# Patient Record
Sex: FEMALE | Race: WHITE | Employment: PART TIME | URBAN - METROPOLITAN AREA
[De-identification: names, ages, dates, MRNs, and addresses within clinical notes are randomized per-mention and may not be internally consistent; named-entity substitution may affect disease eponyms.]

---

## 2022-07-16 ENCOUNTER — HOSPITAL ENCOUNTER (OUTPATIENT)
Age: 62
Setting detail: OBSERVATION
Discharge: HOME | End: 2022-07-18
Attending: INTERNAL MEDICINE | Admitting: INTERNAL MEDICINE
Payer: COMMERCIAL

## 2022-07-16 ENCOUNTER — APPOINTMENT (OUTPATIENT)
Dept: CT IMAGING | Age: 62
End: 2022-07-16
Payer: COMMERCIAL

## 2022-07-16 ENCOUNTER — APPOINTMENT (OUTPATIENT)
Dept: GENERAL RADIOLOGY | Age: 62
End: 2022-07-16
Payer: COMMERCIAL

## 2022-07-16 DIAGNOSIS — G45.9 TIA (TRANSIENT ISCHEMIC ATTACK): Primary | ICD-10-CM

## 2022-07-16 LAB
ALBUMIN SERPL-MCNC: 4.5 G/DL (ref 3.5–4.6)
ALP BLD-CCNC: 78 U/L (ref 40–130)
ALT SERPL-CCNC: 19 U/L (ref 0–33)
ANION GAP SERPL CALCULATED.3IONS-SCNC: 14 MEQ/L (ref 9–15)
APTT: 27.9 SEC (ref 24.4–36.8)
AST SERPL-CCNC: 20 U/L (ref 0–35)
BASOPHILS ABSOLUTE: 0 K/UL (ref 0–0.2)
BASOPHILS RELATIVE PERCENT: 0.6 %
BILIRUB SERPL-MCNC: 0.7 MG/DL (ref 0.2–0.7)
BUN BLDV-MCNC: 14 MG/DL (ref 8–23)
CALCIUM SERPL-MCNC: 9.9 MG/DL (ref 8.5–9.9)
CHLORIDE BLD-SCNC: 101 MEQ/L (ref 95–107)
CO2: 26 MEQ/L (ref 20–31)
CREAT SERPL-MCNC: 0.99 MG/DL (ref 0.5–0.9)
EOSINOPHILS ABSOLUTE: 0 K/UL (ref 0–0.7)
EOSINOPHILS RELATIVE PERCENT: 0.8 %
GFR AFRICAN AMERICAN: >60
GFR NON-AFRICAN AMERICAN: 56.5
GLOBULIN: 2.8 G/DL (ref 2.3–3.5)
GLUCOSE BLD-MCNC: 85 MG/DL (ref 70–99)
HCT VFR BLD CALC: 43.7 % (ref 37–47)
HEMOGLOBIN: 14.5 G/DL (ref 12–16)
INR BLD: 1
LYMPHOCYTES ABSOLUTE: 1.3 K/UL (ref 1–4.8)
LYMPHOCYTES RELATIVE PERCENT: 26.5 %
MAGNESIUM: 2.2 MG/DL (ref 1.7–2.4)
MCH RBC QN AUTO: 30.6 PG (ref 27–31.3)
MCHC RBC AUTO-ENTMCNC: 33.1 % (ref 33–37)
MCV RBC AUTO: 92.5 FL (ref 82–100)
MONOCYTES ABSOLUTE: 0.3 K/UL (ref 0.2–0.8)
MONOCYTES RELATIVE PERCENT: 6.8 %
NEUTROPHILS ABSOLUTE: 3.3 K/UL (ref 1.4–6.5)
NEUTROPHILS RELATIVE PERCENT: 65.3 %
PDW BLD-RTO: 13.5 % (ref 11.5–14.5)
PLATELET # BLD: 216 K/UL (ref 130–400)
POTASSIUM SERPL-SCNC: 4.5 MEQ/L (ref 3.4–4.9)
PROTHROMBIN TIME: 12.9 SEC (ref 12.3–14.9)
RBC # BLD: 4.72 M/UL (ref 4.2–5.4)
SARS-COV-2, NAAT: NOT DETECTED
SODIUM BLD-SCNC: 141 MEQ/L (ref 135–144)
TOTAL PROTEIN: 7.3 G/DL (ref 6.3–8)
TROPONIN: <0.01 NG/ML (ref 0–0.01)
WBC # BLD: 5.1 K/UL (ref 4.8–10.8)

## 2022-07-16 PROCEDURE — G0378 HOSPITAL OBSERVATION PER HR: HCPCS

## 2022-07-16 PROCEDURE — 6370000000 HC RX 637 (ALT 250 FOR IP): Performed by: INTERNAL MEDICINE

## 2022-07-16 PROCEDURE — 80053 COMPREHEN METABOLIC PANEL: CPT

## 2022-07-16 PROCEDURE — 70450 CT HEAD/BRAIN W/O DYE: CPT

## 2022-07-16 PROCEDURE — 2580000003 HC RX 258: Performed by: PHYSICIAN ASSISTANT

## 2022-07-16 PROCEDURE — 87635 SARS-COV-2 COVID-19 AMP PRB: CPT

## 2022-07-16 PROCEDURE — 85025 COMPLETE CBC W/AUTO DIFF WBC: CPT

## 2022-07-16 PROCEDURE — 83735 ASSAY OF MAGNESIUM: CPT

## 2022-07-16 PROCEDURE — 6360000002 HC RX W HCPCS: Performed by: INTERNAL MEDICINE

## 2022-07-16 PROCEDURE — 36415 COLL VENOUS BLD VENIPUNCTURE: CPT

## 2022-07-16 PROCEDURE — 96372 THER/PROPH/DIAG INJ SC/IM: CPT

## 2022-07-16 PROCEDURE — 84484 ASSAY OF TROPONIN QUANT: CPT

## 2022-07-16 PROCEDURE — 71045 X-RAY EXAM CHEST 1 VIEW: CPT

## 2022-07-16 PROCEDURE — 85730 THROMBOPLASTIN TIME PARTIAL: CPT

## 2022-07-16 PROCEDURE — 6370000000 HC RX 637 (ALT 250 FOR IP): Performed by: PHYSICIAN ASSISTANT

## 2022-07-16 PROCEDURE — 99285 EMERGENCY DEPT VISIT HI MDM: CPT

## 2022-07-16 PROCEDURE — 93005 ELECTROCARDIOGRAM TRACING: CPT | Performed by: PHYSICIAN ASSISTANT

## 2022-07-16 PROCEDURE — 85610 PROTHROMBIN TIME: CPT

## 2022-07-16 RX ORDER — ASPIRIN 81 MG/1
324 TABLET, CHEWABLE ORAL ONCE
Status: COMPLETED | OUTPATIENT
Start: 2022-07-16 | End: 2022-07-16

## 2022-07-16 RX ORDER — CALCIUM CARBONATE 500(1250)
1000 TABLET ORAL DAILY
Status: DISCONTINUED | OUTPATIENT
Start: 2022-07-17 | End: 2022-07-18 | Stop reason: HOSPADM

## 2022-07-16 RX ORDER — ZOLPIDEM TARTRATE 5 MG/1
5 TABLET ORAL NIGHTLY PRN
Status: DISCONTINUED | OUTPATIENT
Start: 2022-07-16 | End: 2022-07-18 | Stop reason: HOSPADM

## 2022-07-16 RX ORDER — ONDANSETRON 4 MG/1
4 TABLET, ORALLY DISINTEGRATING ORAL EVERY 8 HOURS PRN
Status: DISCONTINUED | OUTPATIENT
Start: 2022-07-16 | End: 2022-07-18 | Stop reason: HOSPADM

## 2022-07-16 RX ORDER — ONDANSETRON 2 MG/ML
4 INJECTION INTRAMUSCULAR; INTRAVENOUS EVERY 6 HOURS PRN
Status: DISCONTINUED | OUTPATIENT
Start: 2022-07-16 | End: 2022-07-18 | Stop reason: HOSPADM

## 2022-07-16 RX ORDER — METHIMAZOLE 5 MG/1
5 TABLET ORAL DAILY
Status: DISCONTINUED | OUTPATIENT
Start: 2022-07-17 | End: 2022-07-18 | Stop reason: HOSPADM

## 2022-07-16 RX ORDER — ATORVASTATIN CALCIUM 80 MG/1
80 TABLET, FILM COATED ORAL NIGHTLY
Status: DISCONTINUED | OUTPATIENT
Start: 2022-07-16 | End: 2022-07-18 | Stop reason: HOSPADM

## 2022-07-16 RX ORDER — ASPIRIN 300 MG/1
300 SUPPOSITORY RECTAL DAILY
Status: DISCONTINUED | OUTPATIENT
Start: 2022-07-16 | End: 2022-07-18 | Stop reason: HOSPADM

## 2022-07-16 RX ORDER — ZOLPIDEM TARTRATE 10 MG/1
5 TABLET ORAL NIGHTLY PRN
COMMUNITY

## 2022-07-16 RX ORDER — ESCITALOPRAM OXALATE 10 MG/1
10 TABLET ORAL DAILY
Status: DISCONTINUED | OUTPATIENT
Start: 2022-07-17 | End: 2022-07-18 | Stop reason: HOSPADM

## 2022-07-16 RX ORDER — IBUPROFEN 200 MG
2 CAPSULE ORAL DAILY
COMMUNITY

## 2022-07-16 RX ORDER — PANTOPRAZOLE SODIUM 40 MG/1
40 GRANULE, DELAYED RELEASE ORAL
COMMUNITY

## 2022-07-16 RX ORDER — POLYETHYLENE GLYCOL 3350 17 G/17G
17 POWDER, FOR SOLUTION ORAL DAILY PRN
Status: DISCONTINUED | OUTPATIENT
Start: 2022-07-16 | End: 2022-07-18 | Stop reason: HOSPADM

## 2022-07-16 RX ORDER — LABETALOL HYDROCHLORIDE 5 MG/ML
10 INJECTION, SOLUTION INTRAVENOUS EVERY 10 MIN PRN
Status: DISCONTINUED | OUTPATIENT
Start: 2022-07-16 | End: 2022-07-18 | Stop reason: HOSPADM

## 2022-07-16 RX ORDER — 0.9 % SODIUM CHLORIDE 0.9 %
500 INTRAVENOUS SOLUTION INTRAVENOUS ONCE
Status: COMPLETED | OUTPATIENT
Start: 2022-07-16 | End: 2022-07-16

## 2022-07-16 RX ORDER — METHIMAZOLE 5 MG/1
5 TABLET ORAL DAILY
COMMUNITY

## 2022-07-16 RX ORDER — ENOXAPARIN SODIUM 100 MG/ML
40 INJECTION SUBCUTANEOUS DAILY
Status: DISCONTINUED | OUTPATIENT
Start: 2022-07-16 | End: 2022-07-18 | Stop reason: HOSPADM

## 2022-07-16 RX ORDER — PANTOPRAZOLE SODIUM 40 MG/1
40 TABLET, DELAYED RELEASE ORAL
Status: DISCONTINUED | OUTPATIENT
Start: 2022-07-17 | End: 2022-07-18 | Stop reason: HOSPADM

## 2022-07-16 RX ORDER — M-VIT,TX,IRON,MINS/CALC/FOLIC 27MG-0.4MG
1 TABLET ORAL DAILY
COMMUNITY

## 2022-07-16 RX ORDER — ESCITALOPRAM OXALATE 10 MG/1
10 TABLET ORAL DAILY
COMMUNITY

## 2022-07-16 RX ORDER — ASPIRIN 81 MG/1
81 TABLET ORAL DAILY
Status: DISCONTINUED | OUTPATIENT
Start: 2022-07-16 | End: 2022-07-18 | Stop reason: HOSPADM

## 2022-07-16 RX ADMIN — ATORVASTATIN CALCIUM 80 MG: 80 TABLET, FILM COATED ORAL at 20:47

## 2022-07-16 RX ADMIN — ENOXAPARIN SODIUM 40 MG: 100 INJECTION SUBCUTANEOUS at 20:47

## 2022-07-16 RX ADMIN — SODIUM CHLORIDE 500 ML: 9 INJECTION, SOLUTION INTRAVENOUS at 14:26

## 2022-07-16 RX ADMIN — ASPIRIN 324 MG: 81 TABLET, CHEWABLE ORAL at 15:39

## 2022-07-16 SDOH — SOCIAL STABILITY: SOCIAL INSECURITY: WITHIN THE LAST YEAR, HAVE YOU BEEN AFRAID OF YOUR PARTNER OR EX-PARTNER?: NO

## 2022-07-16 SDOH — ECONOMIC STABILITY: TRANSPORTATION INSECURITY
IN THE PAST 12 MONTHS, HAS LACK OF TRANSPORTATION KEPT YOU FROM MEETINGS, WORK, OR FROM GETTING THINGS NEEDED FOR DAILY LIVING?: NO

## 2022-07-16 SDOH — ECONOMIC STABILITY: INCOME INSECURITY: HOW HARD IS IT FOR YOU TO PAY FOR THE VERY BASICS LIKE FOOD, HOUSING, MEDICAL CARE, AND HEATING?: NOT VERY HARD

## 2022-07-16 SDOH — ECONOMIC STABILITY: HOUSING INSECURITY: IN THE LAST 12 MONTHS, HOW MANY PLACES HAVE YOU LIVED?: 1

## 2022-07-16 SDOH — SOCIAL STABILITY: SOCIAL INSECURITY
WITHIN THE LAST YEAR, HAVE YOU BEEN KICKED, HIT, SLAPPED, OR OTHERWISE PHYSICALLY HURT BY YOUR PARTNER OR EX-PARTNER?: NO

## 2022-07-16 SDOH — ECONOMIC STABILITY: TRANSPORTATION INSECURITY
IN THE PAST 12 MONTHS, HAS THE LACK OF TRANSPORTATION KEPT YOU FROM MEDICAL APPOINTMENTS OR FROM GETTING MEDICATIONS?: NO

## 2022-07-16 SDOH — HEALTH STABILITY: MENTAL HEALTH: HOW OFTEN DO YOU HAVE A DRINK CONTAINING ALCOHOL?: MONTHLY OR LESS

## 2022-07-16 SDOH — SOCIAL STABILITY: SOCIAL INSECURITY: WITHIN THE LAST YEAR, HAVE YOU BEEN HUMILIATED OR EMOTIONALLY ABUSED IN OTHER WAYS BY YOUR PARTNER OR EX-PARTNER?: NO

## 2022-07-16 SDOH — SOCIAL STABILITY: SOCIAL INSECURITY
WITHIN THE LAST YEAR, HAVE TO BEEN RAPED OR FORCED TO HAVE ANY KIND OF SEXUAL ACTIVITY BY YOUR PARTNER OR EX-PARTNER?: NO

## 2022-07-16 SDOH — ECONOMIC STABILITY: INCOME INSECURITY: IN THE LAST 12 MONTHS, WAS THERE A TIME WHEN YOU WERE NOT ABLE TO PAY THE MORTGAGE OR RENT ON TIME?: NO

## 2022-07-16 SDOH — HEALTH STABILITY: PHYSICAL HEALTH: ON AVERAGE, HOW MANY MINUTES DO YOU ENGAGE IN EXERCISE AT THIS LEVEL?: 150+ MIN

## 2022-07-16 SDOH — ECONOMIC STABILITY: FOOD INSECURITY: WITHIN THE PAST 12 MONTHS, YOU WORRIED THAT YOUR FOOD WOULD RUN OUT BEFORE YOU GOT MONEY TO BUY MORE.: NEVER TRUE

## 2022-07-16 SDOH — HEALTH STABILITY: MENTAL HEALTH: HOW MANY STANDARD DRINKS CONTAINING ALCOHOL DO YOU HAVE ON A TYPICAL DAY?: 1 OR 2

## 2022-07-16 SDOH — ECONOMIC STABILITY: FOOD INSECURITY: WITHIN THE PAST 12 MONTHS, THE FOOD YOU BOUGHT JUST DIDN'T LAST AND YOU DIDN'T HAVE MONEY TO GET MORE.: NEVER TRUE

## 2022-07-16 SDOH — ECONOMIC STABILITY: HOUSING INSECURITY
IN THE LAST 12 MONTHS, WAS THERE A TIME WHEN YOU DID NOT HAVE A STEADY PLACE TO SLEEP OR SLEPT IN A SHELTER (INCLUDING NOW)?: NO

## 2022-07-16 SDOH — HEALTH STABILITY: PHYSICAL HEALTH: ON AVERAGE, HOW MANY DAYS PER WEEK DO YOU ENGAGE IN MODERATE TO STRENUOUS EXERCISE (LIKE A BRISK WALK)?: 5 DAYS

## 2022-07-16 ASSESSMENT — PAIN - FUNCTIONAL ASSESSMENT: PAIN_FUNCTIONAL_ASSESSMENT: NONE - DENIES PAIN

## 2022-07-16 ASSESSMENT — PAIN SCALES - GENERAL: PAINLEVEL_OUTOF10: 0

## 2022-07-16 NOTE — ACP (ADVANCE CARE PLANNING)
Advance Care Planning     Advance Care Planning Activator (Inpatient)  Conversation Note      Date of ACP Conversation: 7/16/2022     Conversation Conducted with: Patient with Decision Making Capacity    ACP Activator: Anna Olsen RN        Health Care Decision Maker:     Current Designated Health Care Decision Maker:     Primary Decision Maker: Kwabena Kearns - Spouse - [phone]    Secondary Decision Maker: Francine Kussmaul - Brother/Sister - [phone]        Care Preferences    Ventilation: \"If you were in your present state of health and suddenly became very ill and were unable to breathe on your own, what would your preference be about the use of a ventilator (breathing machine) if it were available to you? \"      Would the patient desire the use of ventilator (breathing machine)?: yes    \"If your health worsens and it becomes clear that your chance of recovery is unlikely, what would your preference be about the use of a ventilator (breathing machine) if it were available to you? \"     Would the patient desire the use of ventilator (breathing machine)?: states this would \"probably\" change her opinion. Her brother Manuel Addison is at the bedside for this conversation. The patient declined       Resuscitation  \"CPR works best to restart the heart when there is a sudden event, like a heart attack, in someone who is otherwise healthy. Unfortunately, CPR does not typically restart the heart for people who have serious health conditions or who are very sick. \"    \"In the event your heart stopped as a result of an underlying serious health condition, would you want attempts to be made to restart your heart (answer \"yes\" for attempt to resuscitate) or would you prefer a natural death (answer \"no\" for do not attempt to resuscitate)? \" yes       [x] Yes   [] No   Educated Patient / Koby Christensen regarding differences between Advance Directives and portable DNR orders.     Length of ACP Conversation in minutes: 10    Conversation Outcomes:  [x] ACP discussion completed  [] Existing advance directive reviewed with patient; no changes to patient's previously recorded wishes  [] New Advance Directive completed  [] Portable Do Not Rescitate prepared for Provider review and signature  [] POLST/POST/MOLST/MOST prepared for Provider review and signature      Follow-up plan:    [] Schedule follow-up conversation to continue planning  [] Referred individual to Provider for additional questions/concerns   [] Advised patient/agent/surrogate to review completed ACP document and update if needed with changes in condition, patient preferences or care setting    [x] This note routed to one or more involved healthcare providers

## 2022-07-16 NOTE — ED NOTES
Pt sitting in room at this time talking with family that are sitting at bedside.  Pt continues to deny any dizziness or vision changes at this time     Cali Cueto RN  07/16/22 5079

## 2022-07-16 NOTE — H&P
Patient with past medical history of hypothyroidism and anxiety on Lexapro, recently was admitted to the ER for similar chief complaint on Memorial Day weekend. Went to [facility] and was discharged from the ER after getting a CT head done. Patient was coming off the boat to get something and while on the dock patient became blurry vision/ double vision, dizzy and staggering gait/, drunken gait and was brought to here to ER. CT head was negative. Patient does not remember what happened and how she got to the ER. Also she did not remember last time when she went to [facility], [facility], Patient has strong family history of stroke and aneurysm in both mother and father and brother. Patient's symptoms lasted for 45 minutes or resolved. No focal deficits noted. ER physician assistant spoke with neurology and recommended patient to be admitted for possible posterior CVA. Patient denies shortness of breath, nausea vomiting or diarrhea or abdominal pain. Denies any chest pain. Past Medical History:   Diagnosis Date    Hiatal hernia     Pneumonia     Thyroid disease      Past Surgical History:   Procedure Laterality Date    HAND SURGERY Bilateral     HERNIA REPAIR      SHOULDER SURGERY Right      Social History       Tobacco History       Smoking Status  Former Smoking Tobacco Type  Cigarettes      Smokeless Tobacco Use  Never              Alcohol History       Alcohol Use Status  Not Currently              Drug Use       Drug Use Status  Never              Sexual Activity       Sexually Active  Not Asked                  History reviewed. No pertinent family history. No current facility-administered medications on file prior to encounter. No current outpatient medications on file prior to encounter.      Lab Results   Component Value Date    WBC 5.1 07/16/2022    HGB 14.5 07/16/2022    HCT 43.7 07/16/2022    MCV 92.5 07/16/2022     07/16/2022     Lab Results Component Value Date/Time     07/16/2022 01:45 PM    K 4.5 07/16/2022 01:45 PM     07/16/2022 01:45 PM    CO2 26 07/16/2022 01:45 PM    BUN 14 07/16/2022 01:45 PM    CREATININE 0.99 07/16/2022 01:45 PM    GLUCOSE 85 07/16/2022 01:45 PM    CALCIUM 9.9 07/16/2022 01:45 PM          HEENT: AT/NC, PERRLA, no JVD  HEART: s1/s2 wnl w/o s3 , no m.r.g  LUNG: clear, no wheeze  ABD: soft, NT, ND  EXT: no edema  SKin : no rash  Neuro:no focal deficits, cranial 2 through 12 grossly intact  ROS: 12 point review of system is negative except was stated HPI      1) r/o CVa  2) hyperthyroidism  3) anxiety  4) DVT ppx  Admit to observation, telemetry, neurology evaluation, MRI of brain without contrast, carotid u/s, resume home meds, c/w ASA, lipid panel

## 2022-07-16 NOTE — EC ADMISSION CRITERIA
AdmissionCare    Guideline: Transient Ischemic Attack (TIA), Inpatient    Based on the indications selected for the patient, the bed status of Admit to Inpatient was determined to be NOT MET    AdmissionCare documentation entered by: Rebekah Burton MD    [facility], 26th edition, Copyright © 2022 [address] [place] [address].  [id number]:00

## 2022-07-16 NOTE — ED NOTES
Pt up to restroom at this time and ambulated well with no difficulties at this time. Pt denies any vision changes or dizziness at this time.  Pt has slow steady gait     Kerline Murillo RN  07/16/22 [place] FABRIZIO Paniagua  07/16/22 1894

## 2022-07-16 NOTE — ED PROVIDER NOTES
[address] [place] [address] [address]  [facility]      Pt Name: Rhys Fox  MRN: [id number]  Radhagfcielo 1960  Date ofevaluation: 7/16/2022  Provider: Leeanne Narayanan PA-C    CHIEF COMPLAINT       Chief Complaint   Patient presents with    Dizziness     Pt c/o dizziness and double vision for past 45 min. HISTORY OF PRESENT ILLNESS   (Location/Symptom, Timing/Onset,Context/Setting, Quality, Duration, Modifying Factors, Severity)  Note limiting factors. Rhys Fox is a 59 y.o. female who presents to the emergency department dizziness, double vision and staggering gait that happened while she was at the [employer] dock today. This lasted about 45 minutes and resolved on arrival to the emergency department. She reports she is already feeling much better. She states about a month ago she had a similar episode had a CT of the head done at [facility] and they discharged her home. She denies any falls head injuries. She does have family history of aneurysm and carotid artery disease. HPI    NursingNotes were reviewed. REVIEW OF SYSTEMS    (2-9 systems for level 4, 10 or more for level 5)     Review of Systems   Constitutional:  Negative for chills, diaphoresis, fatigue and fever. HENT:  Negative for congestion, rhinorrhea and sore throat. Eyes:  Positive for visual disturbance. Negative for photophobia and pain. Respiratory:  Negative for cough and shortness of breath. Cardiovascular:  Negative for chest pain and palpitations. Gastrointestinal:  Negative for abdominal pain, diarrhea, nausea and vomiting. Genitourinary:  Negative for dysuria and flank pain. Musculoskeletal:  Positive for gait problem. Negative for back pain. Skin:  Negative for rash. Neurological:  Positive for dizziness. Negative for light-headedness and headaches. Psychiatric/Behavioral: Negative. All other systems reviewed and are negative.     Except as noted above the remainder of the review of systems was reviewed and negative. PAST MEDICAL HISTORY     Past Medical History:   Diagnosis Date    Arthritis     GERD (gastroesophageal reflux disease)     Hiatal hernia     Other disorders of kidney and ureter in diseases classified elsewhere     Pneumonia     Thyroid disease          SURGICALHISTORY       Past Surgical History:   Procedure Laterality Date    HAND SURGERY Bilateral     HERNIA REPAIR      SHOULDER SURGERY Right          CURRENT MEDICATIONS       Current Discharge Medication List        CONTINUE these medications which have NOT CHANGED    Details   methIMAzole (TAPAZOLE) 5 MG tablet Take 5 mg by mouth in the morning. escitalopram (LEXAPRO) 10 MG tablet Take 10 mg by mouth in the morning. pantoprazole sodium (PROTONIX) 40 MG PACK packet Take 40 mg by mouth every morning (before breakfast)      calcium carbonate (OYSTER SHELL CALCIUM 500 MG) 1250 (500 Ca) MG tablet Take 2 tablets by mouth in the morning. Multiple Vitamins-Minerals (THERAPEUTIC MULTIVITAMIN-MINERALS) tablet Take 1 tablet by mouth in the morning. zolpidem (AMBIEN) 10 MG tablet Take 5 mg by mouth nightly as needed for Sleep. ALLERGIES     Penicillins    FAMILY HISTORY     History reviewed. No pertinent family history.        SOCIAL HISTORY       Social History     Socioeconomic History    Marital status:      Spouse name: None    Number of children: None    Years of education: None    Highest education level: None   Tobacco Use    Smoking status: Former     Types: Cigarettes    Smokeless tobacco: Never   Substance and Sexual Activity    Alcohol use: Not Currently    Drug use: Never     Social Determinants of Health     Financial Resource Strain: Low Risk     Difficulty of Paying Living Expenses: Not very hard   Food Insecurity: No Food Insecurity    Worried About Running Out of Food in the Last Year: Never true    Ran Out of Food in the Last Year: Never true   Transportation Needs: No Transportation Needs    Lack of Transportation (Medical): No    Lack of Transportation (Non-Medical): No   Physical Activity: Sufficiently Active    Days of Exercise per Week: 5 days    Minutes of Exercise per Session: 150+ min   Intimate Partner Violence: Not At Risk    Fear of Current or Ex-Partner: No    Emotionally Abused: No    Physically Abused: No    Sexually Abused: No   Housing Stability: Low Risk     Unable to Pay for Housing in the Last Year: No    Number of Places Lived in the Last Year: 1    Unstable Housing in the Last Year: No       SCREENINGS   NIH Stroke Scale  Interval: Reassessment  Level of Consciousness (1a): Alert  LOC Questions (1b): Answers both correctly  LOC Commands (1c): Performs both tasks correctly  Best Gaze (2): Normal  Visual (3): No visual loss  Facial Palsy (4): Normal symmetrical movement  Motor Arm, Left (5a): No drift  Motor Arm, Right (5b): No drift  Motor Leg, Left (6a): No drift  Motor Leg, Right (6b): No drift  Limb Ataxia (7): Absent  Sensory (8): Normal  Best Language (9): No aphasia  Dysarthria (10): Normal  Extinction and Inattention (11): No abnormality  Total: 0Glasgow Coma Scale  Eye Opening: Spontaneous  Best Verbal Response: Oriented  Best Motor Response: Obeys commands  [place] Coma Scale Score: 15 @FLOW([id number])@      PHYSICAL EXAM    (up to 7 for level 4, 8 or more for level 5)     ED Triage Vitals [07/16/22 1322]   BP Temp Temp Source Heart Rate Resp SpO2 Height Weight   129/66 97.7 °F (36.5 °C) Oral 82 17 98 % 5' 6\" (1.676 m) 150 lb (68 kg)       Physical Exam  Vitals and nursing note reviewed. Constitutional:       General: She is not in acute distress. Appearance: Normal appearance. She is well-developed. She is not diaphoretic. HENT:      Head: Normocephalic and atraumatic.       Nose: Nose normal.      Mouth/Throat:      Mouth: Mucous membranes are moist.   Eyes:      General: Lids are normal.      Conjunctiva/sclera: Conjunctivae normal.   Cardiovascular: Result      There is no acute intracranial process. mra head w/o contrast    (Results Pending)   MRI BRAIN WO CONTRAST    (Results Pending)         ED BEDSIDE ULTRASOUND:   Performed by ED Physician - none    LABS:  Labs Reviewed   COMPREHENSIVE METABOLIC PANEL - Abnormal; Notable for the following components:       Result Value    CREATININE 0.99 (*)     GFR Non- 56.5 (*)     All other components within normal limits   CBC - Abnormal; Notable for the following components:    WBC 4.3 (*)     All other components within normal limits   LIPID PANEL - Abnormal; Notable for the following components:    Cholesterol, Total 263 (*)     Triglycerides 156 (*)     HDL 67 (*)     LDL Calculated 165 (*)     All other components within normal limits   COVID-19, RAPID   CBC WITH AUTO DIFFERENTIAL   MAGNESIUM   TROPONIN   PROTIME-INR   APTT   HEMOGLOBIN A1C       All other labs were within normal range or not returned as of this dictation. EMERGENCY DEPARTMENT COURSE and DIFFERENTIAL DIAGNOSIS/MDM:   Vitals:    Vitals:    07/16/22 1951 07/16/22 2019 07/16/22 2034 07/16/22 2058   BP: (!) 134/57  (!) 144/74    Pulse: 58  55    Resp:    18   Temp: 98.2 °F (36.8 °C)  99.2 °F (37.3 °C)    TempSrc:   Oral    SpO2: 95% (!) 0% 97%    Weight:       Height:               MDM    Patient presents with dizziness blurred vision and gait abnormality that resolved on arrival to the emergency department. On my exam she has no neurological deficits. CT head negative chest x-ray unremarkable labs grossly unremarkable. She does have positive family history for aneurysms, carotid artery disease and family history of strokes. Patient otherwise has minimal risk factors she has no history of hypertension hypercholesterolemia she is not a smoker. But due to her symptoms I am concerned that this could be a posterior CVA. I spoke to neurology on-call Dr. Duane Cordova who recommended full dose aspirin and to admit.   Hospitalist Dr. Roxane Servin excepted the patient. Patient stable and ready for admission. REASSESSMENT          CRITICAL CARE TIME   Total Critical Care time was  minutes, excluding separatelyreportable procedures. There was a high probability ofclinically significant/life threatening deterioration in the patient's condition which required my urgent intervention. CONSULTS:  IP CONSULT TO NEUROLOGY  IP CONSULT TO CASE MANAGEMENT  IP CONSULT TO DIETITIAN  IP CONSULT TO SOCIAL WORK    PROCEDURES:  Unless otherwise noted below, none     Procedures    FINAL IMPRESSION      1. TIA (transient ischemic attack)          DISPOSITION/PLAN   DISPOSITION Admitted 07/16/2022 03:18:51 PM      PATIENT REFERREDTO:  No follow-up provider specified.     DISCHARGEMEDICATIONS:  Current Discharge Medication List             (Please note that portions of this note were completed with a voice recognition program.  Efforts were made to edit the dictations but occasionally words are mis-transcribed.)    Connie Lance PA-C (electronically signed)  Attending Emergency Physician         Connie Lance PA-C  07/17/22 [id number]

## 2022-07-16 NOTE — CARE COORDINATION
[facility] EMERGENCY [facility] AT [place] Case Management Initial Discharge Assessment    Met with Patient to discuss discharge plan. PCP: Valerie Amaral MD                                Date of Last Visit: June    [facility] Patient: No        [facility] Notified: no    If no PCP, list provided? N/A    Discharge Planning    Living Arrangements: independently at home    Who do you live with?     Who helps you with your care:  self    If lives at home:     Do you have any barriers navigating in your home? no    Patient can perform ADL? Yes    Current Services (outpatient and in home) :  None    Dialysis: No    Is transportation available to get to your appointments? Yes    DME Equipment:  no    Respiratory equipment: None    Respiratory provider:  no     [facility]    Consult with Medication Assistance Program?  No      [place] agreeable to [place] [address]? Declined    Patient agreeable to SNF/Rehab? Declined    Other discharge needs identified? N/A    Does Patient Have a High-Risk for Readmission Diagnosis (CHF, PN, MI, COPD)? No    Initial Discharge Plan? (Note: please see concurrent daily documentation for any updates after initial note). Pt denied any d/c needs in ER. Cm to assess for d/c needs and referrals.     Readmission Risk              Risk of Unplanned Readmission:  0         Electronically signed by Kelly Mars RN on 7/16/2022 at 4:10 PM

## 2022-07-16 NOTE — ED NOTES
Pt states got on boat to [facility] something\" and got off and started walking toward the beach. Pt states that when she was heading to the beach she began having double vision and staggered gait. Pt states that she then said \"to someone, is there 2 of that alayna down there? \". Pt then only remembers that she was in the car and then her brother and sister and law arrive. Pt  was with pt at this time. Pt was then refusing to come to hospital but  brought anyway. Pt states that she was seen at [facility]. Pt is alert and oriented times 4. Pt denies any dizziness at this time. Pt denies any double vision\".  Pt denies any N/V.      Dav Trujillo RN  07/16/22 1911

## 2022-07-16 NOTE — PROGRESS NOTES
DVT / VTE PROPHYLAXIS EVALUATION    Estimated Creatinine Clearance: 54 mL/min (A) (based on SCr of 0.99 mg/dL (H)). Recent Labs     07/16/22  1345   BUN 14   CREATININE 0.99*      HGB 14.5   HCT 43.7   INR 1.0     ADMITTING DX OR CHIEF COMPLAINT?  Rule out CVA  WARFARIN? DOAC'S? no  ANY APPARENT BLEEDING? no  SCHEDULED SURGERY? no     Current order:  Enoxaparin 40 mg SUBQ once daily      Plan:  No intervention recommended, continue current VTE prophylaxis as ordered     Patient Weight (kg)      50.9 and below .9 101-150.9 151-174.9 175 or greater   Estimated   CrCl  (ml/min) 30 or greater []   30 mg   SUBQ daily   [x]   40 mg   SUBQ daily []  30 mg SUBQ   BID  []  40 mg   SUBQ   BID []  60mg SUBQ BID    15-29.9 []  UFH 5000   units SUBQ BID []  30 mg   SUBQ daily [] 30 mg SUBQ   daily []  40 mg SUBQ   daily [] 60 mg SUBQ   daily    Less than 15 or dialysis []  UFH 5000   units SUBQ BID [] UFH 5000 units SUBQ TID []  UFH 7500   units   SUBQ TID        Francesco Naranjo [facility] [place] PharmD

## 2022-07-17 ENCOUNTER — APPOINTMENT (OUTPATIENT)
Dept: MRI IMAGING | Age: 62
End: 2022-07-17
Payer: COMMERCIAL

## 2022-07-17 PROBLEM — H53.2 DOUBLE VISION: Status: ACTIVE | Noted: 2022-07-17

## 2022-07-17 PROBLEM — G45.0 VBI (VERTEBROBASILAR INSUFFICIENCY): Status: ACTIVE | Noted: 2022-07-17

## 2022-07-17 PROBLEM — R42 DIZZINESS: Status: ACTIVE | Noted: 2022-07-17

## 2022-07-17 PROBLEM — G45.4 TGA (TRANSIENT GLOBAL AMNESIA): Status: ACTIVE | Noted: 2022-07-17

## 2022-07-17 LAB
CHOLESTEROL, TOTAL: 263 MG/DL (ref 0–199)
HBA1C MFR BLD: 5.5 % (ref 4.8–5.9)
HCT VFR BLD CALC: 43.2 % (ref 37–47)
HDLC SERPL-MCNC: 67 MG/DL (ref 40–59)
HEMOGLOBIN: 14.4 G/DL (ref 12–16)
LDL CHOLESTEROL CALCULATED: 165 MG/DL (ref 0–129)
MCH RBC QN AUTO: 31.1 PG (ref 27–31.3)
MCHC RBC AUTO-ENTMCNC: 33.3 % (ref 33–37)
MCV RBC AUTO: 93.2 FL (ref 82–100)
PDW BLD-RTO: 13.9 % (ref 11.5–14.5)
PLATELET # BLD: 214 K/UL (ref 130–400)
RBC # BLD: 4.64 M/UL (ref 4.2–5.4)
TRIGL SERPL-MCNC: 156 MG/DL (ref 0–150)
WBC # BLD: 4.3 K/UL (ref 4.8–10.8)

## 2022-07-17 PROCEDURE — 92610 EVALUATE SWALLOWING FUNCTION: CPT

## 2022-07-17 PROCEDURE — 85027 COMPLETE CBC AUTOMATED: CPT

## 2022-07-17 PROCEDURE — 6370000000 HC RX 637 (ALT 250 FOR IP): Performed by: INTERNAL MEDICINE

## 2022-07-17 PROCEDURE — 6360000002 HC RX W HCPCS: Performed by: INTERNAL MEDICINE

## 2022-07-17 PROCEDURE — 92523 SPEECH SOUND LANG COMPREHEN: CPT

## 2022-07-17 PROCEDURE — 70551 MRI BRAIN STEM W/O DYE: CPT

## 2022-07-17 PROCEDURE — 97161 PT EVAL LOW COMPLEX 20 MIN: CPT

## 2022-07-17 PROCEDURE — 99222 1ST HOSP IP/OBS MODERATE 55: CPT | Performed by: PSYCHIATRY & NEUROLOGY

## 2022-07-17 PROCEDURE — G0378 HOSPITAL OBSERVATION PER HR: HCPCS

## 2022-07-17 PROCEDURE — 36415 COLL VENOUS BLD VENIPUNCTURE: CPT

## 2022-07-17 PROCEDURE — 96372 THER/PROPH/DIAG INJ SC/IM: CPT

## 2022-07-17 PROCEDURE — 80061 LIPID PANEL: CPT

## 2022-07-17 PROCEDURE — 97165 OT EVAL LOW COMPLEX 30 MIN: CPT

## 2022-07-17 PROCEDURE — 70544 MR ANGIOGRAPHY HEAD W/O DYE: CPT

## 2022-07-17 PROCEDURE — 83036 HEMOGLOBIN GLYCOSYLATED A1C: CPT

## 2022-07-17 RX ADMIN — ZOLPIDEM TARTRATE 5 MG: 5 TABLET ORAL at 00:42

## 2022-07-17 RX ADMIN — PANTOPRAZOLE SODIUM 40 MG: 40 TABLET, DELAYED RELEASE ORAL at 07:45

## 2022-07-17 RX ADMIN — ESCITALOPRAM OXALATE 10 MG: 10 TABLET ORAL at 11:58

## 2022-07-17 RX ADMIN — ATORVASTATIN CALCIUM 80 MG: 80 TABLET, FILM COATED ORAL at 20:15

## 2022-07-17 RX ADMIN — METHIMAZOLE 5 MG: 5 TABLET ORAL at 07:45

## 2022-07-17 RX ADMIN — ZOLPIDEM TARTRATE 5 MG: 5 TABLET ORAL at 20:15

## 2022-07-17 RX ADMIN — ENOXAPARIN SODIUM 40 MG: 100 INJECTION SUBCUTANEOUS at 20:15

## 2022-07-17 RX ADMIN — ASPIRIN 81 MG: 81 TABLET, COATED ORAL at 11:58

## 2022-07-17 RX ADMIN — CALCIUM 1000 MG: 500 TABLET ORAL at 11:57

## 2022-07-17 ASSESSMENT — ENCOUNTER SYMPTOMS
EYE PAIN: 0
TROUBLE SWALLOWING: 0
ABDOMINAL PAIN: 0
CHOKING: 0
SORE THROAT: 0
BACK PAIN: 0
DIARRHEA: 0
COLOR CHANGE: 0
COUGH: 0
NAUSEA: 0
RHINORRHEA: 0
PHOTOPHOBIA: 0
SHORTNESS OF BREATH: 0
VOMITING: 0
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
## 2022-07-17 NOTE — PROGRESS NOTES
[facility]   Facility/Department: [address] Mayda Amy  Speech Language Pathology  Clinical Bedside Swallow Evaluation    NAME:Paulette Angela  : 1960 (59 y.o.)   [x]   confirmed    MRN: [id number]  [facility]  ADMISSION DATE: 2022  PATIENT DIAGNOSIS(ES): TIA (transient ischemic attack) [G45.9]  Chief Complaint   Patient presents with    Dizziness     Pt c/o dizziness and double vision for past 45 min. Patient Active Problem List    Diagnosis Date Noted    TIA (transient ischemic attack) 2022     Past Medical History:   Diagnosis Date    Arthritis     GERD (gastroesophageal reflux disease)     Hiatal hernia     Other disorders of kidney and ureter in diseases classified elsewhere     Pneumonia     Thyroid disease      Past Surgical History:   Procedure Laterality Date    HAND SURGERY Bilateral     HERNIA REPAIR      SHOULDER SURGERY Right      Allergies   Allergen Reactions    Penicillins        DATE ONSET: 2022    Date of Evaluation: 2022   Evaluating Therapist: Airam Singletary. Sandra Joy, SLP    Dysphagia Diagnosis  Dysphagia Diagnosis: Swallow function appears WFL  Dysphagia Impression : Patient p/w functional oral/pharyngeal swallow with adequate bolus prep and transfer and no overt s/s of aspiration. Recommended Diet  Recommendations: Self feed  Diet Solids Recommendation: Regular  Liquid Consistency Recommendation: Thin  Recommended Form of Meds: PO  Compensatory Swallowing Strategies : Upright as possible for all oral intake      Reason for Referral  Tiffany Ko was referred for a bedside swallow evaluation to assess the efficiency of her swallow function, identify signs and symptoms of aspiration, identify risk factors, and make recommendations regarding safe dietary consistencies, effective compensatory strategies, and safe eating environment. General  Chart Reviewed: Yes  Behavior/Cognition: Alert; Cooperative;Pleasant mood  Respiratory Status: Room air  Communication Observation: Functional  Follows Directions: Simple  Dentition: Adequate; Some missing teeth  Patient Positioning: Upright in bed  Baseline Vocal Quality: Normal  Prior Dysphagia History: Pt reported history of globus sensation in throat when swallowing. Endocopy revealed hiatal hernia and GERD. Since being on medication, pt does not experience globus sensation. Consistencies Administered: Regular; Thin - cup    Vision and Hearing  Vision  Vision Exceptions: Wears glasses for reading  Hearing  Hearing: Within functional limits    Current Diet level  Current Diet : Regular  Current Liquid Diet : Thin    Oral Motor  Labial: No impairment  Dentition: Extractions; Natural;Intact  Oral Hygiene: Clean  Lingual: No impairment  Velum: No Impairment  Mandible: No impairment    Oral/Pharyngeal Phase  Oral Phase - Comment: Oral stage WFL  Pharyngeal Phase: Pharyngeal stage WFL    PO Trials  Consistency Presented: Regular; Thin  How Presented: Cup/gulp; Self-fed/presented  How much presented:  (1 kavon cracker and 3 oz water from cup)  Bolus Acceptance: No impairment  Bolus Formation/Control: No impairment  Propulsion: No impairment  Oral Residue: None  Initiation of Swallow: No impairment  Laryngeal Elevation: Functional  Aspiration Signs/Symptoms: None  Pharyngeal Phase Characteristics: No impairment, issues, or problems      Dysphagia Diagnosis  Dysphagia Diagnosis: Swallow function appears WFL  Dysphagia Impression : Patient p/w functional oral/pharyngeal swallow with adequate bolus prep and transfer and no overt s/s of aspiration. Dysphagia Outcome Severity Scale: Level 7: Normal in all situations     Recommendations  Requires SLP Intervention: No  Recommendations: Self feed  Diet Solids Recommendation: Regular  Liquid Consistency Recommendation:  Thin  Compensatory Swallowing Strategies : Upright as possible for all oral intake  Recommended Form of Meds: PO    Education  Individuals consulted  Consulted and agree with results and recommendations: Patient    Safety Devices  Safety Devices  Safety Devices in place: Yes  Type of devices: Bed alarm in place;Call light within reach    Pain Assessment  Pain Assessment: Patient does not c/o pain    Pain Re-assessment  Pain Reassessment: Patient does not c/o pain      Therapy Time  SLP Individual Minutes  Time In: 9995  Time Out: 0900  Minutes: 10            Signature: Electronically signed by You Matias.  YASHIRA Chavez on 7/17/2022 at 9:05 AM

## 2022-07-17 NOTE — PROGRESS NOTES
[facility]   Facility/Department: [address] [place]  Speech Language Pathology  Initial Speech/Language/Cognitive Assessment    NAME:Paulette Khan  : 1960 (59 y.o.)   [x]   confirmed    MRN: [id number]  ROOM: [id number]  ADMISSION DATE: 2022  PATIENT DIAGNOSIS(ES): TIA (transient ischemic attack) [G45.9]  Chief Complaint   Patient presents with    Dizziness     Pt c/o dizziness and double vision for past 45 min. Patient Active Problem List    Diagnosis Date Noted    TIA (transient ischemic attack) 2022     Past Medical History:   Diagnosis Date    Arthritis     GERD (gastroesophageal reflux disease)     Hiatal hernia     Other disorders of kidney and ureter in diseases classified elsewhere     Pneumonia     Thyroid disease      Past Surgical History:   Procedure Laterality Date    HAND SURGERY Bilateral     HERNIA REPAIR      SHOULDER SURGERY Right        DATE ONSET: 2022    Date of Evaluation: 2022   Evaluating Therapist: Michaela Flaherty, SLP        Diagnosis: Patient p/w functional speech and receptive and expressive language skills. Patient scored 22/30 on the SLUMS cognitive screen which indicates a suspected mild neurocognitive disorder. Further testing is warranted pending MRI results and course of treatment. Requires SLP Intervention: Yes        General  Previous level of function and limitations: Independent   Pt was alert, cooperative, and pleasant for evaluation. Vision and Hearing  Vision  Vision Exceptions: Wears glasses for reading  Hearing  Hearing: Within functional limits     Oral/Motor  Oral Motor   Labial: No impairment  Dentition: Extractions; Natural;Intact  Oral Hygiene: Clean  Lingual: No impairment  Velum: No Impairment  Mandible: No impairment    Motor Speech  Motor Speech  Apraxic Characteristics: None  Dysarthric Characteristics: None  Intelligibility: No impairment    Comprehension  Auditory Comprehension  Comprehension: Within Functional Limits    Expression  Expression  Primary Mode of Expression: Verbal  Verbal Expression  Verbal Expression: Within functional limits  Written Expression  Dominant Hand: Right    Cognition  Orientation  Overall Orientation Status: Within Normal Limits  Attention  Attention: Within Functional Limits  Memory  Memory: Exceptions to [facility]  Short-term Memory: Mild  Problem Solving  Problem Solving: Exceptions to [facility]  Executive Function Skills: Mild  Numeric Reasoning  Numeric Reasoning: Exceptions to [facility]   Calculations: Mild  Safety/Judgment  Safety/Judgment: Within Functional Limits    Additional Assessments   The patient completed the [employer] Mental Status (SLUMS) Examination on this date, which is a useful screening tool for detecting mild cognitive impairment and signs of dementia. Range of impairments based on score are indicated in the chart below:      High school education  Scoring Less than High School Education   27-30 Normal 25-30   21-26 Mild Neurocognitive disorder 20-24   1-20 Dementia 1-19     Patient scored 22/30 on this date, which indicates a mild neurocognitive disorder. Recommendations  Requires SLP Intervention: Yes  Patient Education: Educated pt on results  Patient Education Response: Verbalizes understanding  Duration of Treatment: 1 week  Frequency of Treatment: 1-2x/week    Prognosis  Speech Therapy Prognosis  Prognosis: Excellent  Prognosis Considerations: Age; Motivation    Education  Individuals consulted  Consulted and agree with results and recommendations: Patient    Treatment/Goals  Short-term Goals  Timeframe for Short-term Goals: 1 week  Goal 1: Discuss screening results with patient. Goal 2: Pending MRI results and if TIA or stroke is confirmed, administer cognitive assessment. Add goals accordingly.   Long-term Goals  Timeframe for Long-term Goals: 1 week  Goal 1: Pt will demonstrate functional cognitive-linguistic abilities in all opportunities with modified independence in order to safely complete ADLs. Patient's goals: to go home    Safety Devices  Safety Devices  Safety Devices in place: Yes  Type of devices: Bed alarm in place;Call light within reach    Pain Assessment  Pain Assessment: Patient does not c/o pain    Pain Re-assessment  Pain Reassessment: Patient does not c/o pain         Therapy Time  SLP Individual Minutes  Time In: 0840  Time Out: 1539  Minutes: 10            Signature: Electronically signed by Claire Garvin.  YASHIRA Lutz on 7/17/2022 at 9:14 AM

## 2022-07-17 NOTE — PROGRESS NOTES
Pt neuro assessment complete. Pt Denies any blurred vision. Pt denies HA, dizziness, or distress. Alert and oriented. Gait steady with ambulation. Call light in reach.

## 2022-07-17 NOTE — PROGRESS NOTES
07/17/22    From:HOME WITH SPOUSE    Admit:TIA     PMH:GERD, HIATAL HERNIA, H/O PALPITATIONS, MAY NEED MONITOR CARDIO AT [address] [place] [address]    Anticipated Discharge Disposition:HOME WITH SPOUSE    Patient Mobility or PT/OT ordered:YES    Consults: NEUROLOGY    Clinical: EEG, MRI AND MRA ALL PENDING  CT OF HEAD NEG  CREAT 0.99 ALL OTHER LABS UNREMARKABLE    Barriers to Discharge:EEG _______    Assessments: CMI AND ACP DONE, OBSERVATION

## 2022-07-17 NOTE — CONSULTS
Spiritual Care Services     Summary of Visit:  Consult placed by nurse for AD information. Patient resting in bed. Very engaging and in good spirits. Documents were explained to her and left with her. She was instructed to contact Spiritual Care if further assistance is required. Patient hopeful of being discharged soon! Spiritual Assessment/Intervention/Outcomes:    Encounter Summary  Encounter Overview/Reason : Initial Encounter, Advance Care Planning  Service Provided For[de-identified] Patient  Referral/Consult From[de-identified] Nurse, Patient  Support System: Spouse  Complexity of Encounter: Low  Begin Time: 1005  End Time : 3797  Total Time Calculated: 10 min  Encounter   Type: Initial Screen/Assessment                          Values / Beliefs  Do You Have Any Ethnic, Cultural, Sacramental, or Spiritual Tenriism Needs You Would Like Us To Be Aware of While You Are in the Hospital : No    Care Plan:    No follow up required unless family requests. Spiritual Care Services   Electronically signed by Johnathon Marsh on 7/17/22 at 11:00 AM EDT     To reach a  for emotional and spiritual support, place an [facility] consult request.   If a  is needed immediately, dial 0 and ask to page the on-call .

## 2022-07-17 NOTE — PROGRESS NOTES
[facility]   Facility/Department: [facility] [place]  Speech Language Pathology    Luba Lal  : 1960  ROOM: [id number]      DATE: 2022      This patient is currently in observation/outpatient status. To comply with Medicare and insurance regulations, please update orders to include a valid Speech Therapy treatment diagnosis (i.e. aphasia, dysphagia, dysarthria, cognitive impairment). Thank you,  Noé Reynolds.  Torie Bianchi, SLP, CCC-SLP, Date: 2022, Time: 8:17 AM

## 2022-07-17 NOTE — PROGRESS NOTES
Progress Note  Date:2022       LB:[id number]  Patient Wilhemena Halo     YOB: 1960     Age:61 y.o. Subjective    Subjective:  Symptoms:  No shortness of breath, malaise, cough, chest pain, weakness, headache, chest pressure, anorexia, diarrhea or anxiety. Diet:  No nausea or vomiting. Review of Systems   Respiratory:  Negative for cough and shortness of breath. Cardiovascular:  Negative for chest pain. Gastrointestinal:  Negative for anorexia, diarrhea, nausea and vomiting. Neurological:  Negative for weakness. Objective         Vitals Last 24 Hours:  TEMPERATURE:  Temp  Av.3 °F (36.8 °C)  Min: 97.7 °F (36.5 °C)  Max: 99.2 °F (37.3 °C)  RESPIRATIONS RANGE: Resp  Av.8  Min: 14  Max: 20  PULSE OXIMETRY RANGE: SpO2  Av.7 %  Min: 0 %  Max: 100 %  PULSE RANGE: Pulse  Av.9  Min: 55  Max: 82  BLOOD PRESSURE RANGE: Systolic (56YJA), GEU:379 , Min:129 , MHP:170   ; Diastolic (53LJA), BRK:96, Min:57, Max:94    I/O (24Hr): No intake or output data in the 24 hours ending 22 1131  Objective:  General Appearance:  Comfortable, well-appearing and in no acute distress. Vital signs: (most recent): Blood pressure (!) 144/74, pulse 55, temperature 99.2 °F (37.3 °C), temperature source Oral, resp. rate 18, height 5' 6\" (1.676 m), weight 150 lb (68 kg), SpO2 97 %. HEENT: Normal HEENT exam.    Lungs:  Normal effort. Heart: Normal rate. S1 normal and S2 normal.    Abdomen: Abdomen is soft. Bowel sounds are normal.   There is no epigastric area or suprapubic area tenderness. Pulses: Distal pulses are intact. Neurological: Patient is alert and oriented to person, place and time. Pupils:  Pupils are equal, round, and reactive to light. Skin:  Warm and dry.     Labs/Imaging/Diagnostics    Labs:  CBC:  Recent Labs     22  1345 22  0453   WBC 5.1 4.3*   RBC 4.72 4.64   HGB 14.5 14.4   HCT 43.7 43.2   MCV 92.5 93.2   RDW 13.5 13.9   PLT 216 214     CHEMISTRIES:  Recent Labs     07/16/22  1345      K 4.5      CO2 26   BUN 14   CREATININE 0.99*   GLUCOSE 85   MG 2.2     PT/INR:  Recent Labs     07/16/22  1345   PROTIME 12.9   INR 1.0     APTT:  Recent Labs     07/16/22  1345   APTT 27.9     LIVER PROFILE:  Recent Labs     07/16/22  1345   AST 20   ALT 19   BILITOT 0.7   ALKPHOS 78       Imaging Last 24 Hours:  CT HEAD WO CONTRAST    Result Date: 7/16/2022  EXAMINATION: CT HEAD WO CONTRAST, 7/16/2022 2:01 PM CLINICAL HISTORY:  dizziness blurred vision COMPARISON: None TECHNIQUE:  Multiple contiguous axial images of the head were obtained from the skull base through the skull vertex without intravenous contrast. Sagittal and coronal 3D reformats have been produced. All CT scans at this facility use dose modulation, iterative reconstruction, and/or weight based dosing when appropriate to reduce radiation dose to as low as reasonably achievable. BRAIN CT FINDINGS: Gray-white matter differentiation is maintained. No acute hemorrhage, mass, mass effect, or midline shift. There is no evidence of atrophy, ventricular morphology is within normal limits. The subcortical and periventricular white matter is within normal limits. The basal ganglia are within normal limits. There are no acute changes or space-occupying lesions in the posterior fossa. The visualized portions of the orbits are within normal limits. The globes are intact. The imaged portions of the paranasal sinuses are unremarkable. The calvarium is intact. There is no acute intracranial process. XR CHEST PORTABLE    Result Date: 7/16/2022  Exam: XR CHEST PORTABLE History:  dizziness Technique: AP portable view of the chest obtained. Comparison: Chest x-ray from July 16, 2011 Chest x-ray portable Findings: The cardiomediastinal silhouette is within normal limits. There are no infiltrates, consolidations or effusions. . Bones of the thorax appear intact.      No radiographic evidence of acute intrathoracic process. Assessment//Plan           Hospital Problems             Last Modified POA    * (Principal) TIA (transient ischemic attack) 7/16/2022 Yes    VBI (vertebrobasilar insufficiency) 7/17/2022 Yes    TGA (transient global amnesia) 7/17/2022 Yes    Dizziness 7/17/2022 Yes    Double vision 7/17/2022 Yes      1) r/o CVa  2) hyperthyroidism  3) anxiety  4) DVT ppx  Assessment & Plan  7/17: Follow-up MRI and carotid ultrasound, Dr. Paresh Resendiz order EEG. Spoke with nursing about the care. Continue current medications. Continue with aspirin for now. No focal deficits noted ,no dizziness noted. Anticipate discharge tomorrow.   Electronically signed by Дмитрий Iyer MD on 7/17/22 at 11:31 AM EDT

## 2022-07-17 NOTE — PROGRESS NOTES
[facility] OCCUPATIONAL THERAPY EVALUATION - ACUTE     NAME: Abimbola Ebuanks  : 1960 (57 y.o.)  MRN: [id number]  CODE STATUS: Full Code  Room: E767/K457-45    Date of Service: 2022    Patient Diagnosis(es): TIA (transient ischemic attack) [G45.9]   Patient Active Problem List    Diagnosis Date Noted    VBI (vertebrobasilar insufficiency) 2022    TGA (transient global amnesia) 2022    Dizziness 2022    Double vision 2022    TIA (transient ischemic attack) 2022        Past Medical History:   Diagnosis Date    Arthritis     GERD (gastroesophageal reflux disease)     Hiatal hernia     Other disorders of kidney and ureter in diseases classified elsewhere     Pneumonia     Thyroid disease      Past Surgical History:   Procedure Laterality Date    HAND SURGERY Bilateral     HERNIA REPAIR      SHOULDER SURGERY Right         Restrictions  Restrictions/Precautions: None              Safety Devices: Safety Devices  Type of Devices: None     Patient's date of birth confirmed: Yes    General:  Chart Reviewed: Yes  Patient assessed for rehabilitation services?: Yes    Subjective  Subjective: \"i just want to figure this out\"       Pain at start of treatment: No    Pain at end of treatment: No      Prior Level of Function:  Social/Functional History  Lives With: Spouse  Type of Home: House  Home Layout: Two level, Bed/Bath upstairs, 1/2 bath on main level (1st floor laundry)  Home Access: Stairs to enter without rails  Entrance Stairs - Number of Steps: 1 small  Bathroom Shower/Tub: Tub/Shower unit, Curtain (both bathes and showers at times)  Stewart Toilet: Handicap height  Bathroom Equipment: 3-in-1 commode, Shower chair  Home Equipment: [place], Walker, standard, Crutches  Has the patient had two or more falls in the past year or any fall with injury in the past year?: No  ADL Assistance: Independent  Homemaking Assistance: Independent  Homemaking Responsibilities: Yes  Meal Prep Responsibility: Primary  Laundry Responsibility: Primary  Cleaning Responsibility:  (shares with )  Bill Paying/Finance Responsibility: Primary  Shopping Responsibility: Primary  Health Care Management:  (patient does her own and uses a medication organizer)  Ambulation Assistance: Independent  Transfer Assistance: Independent  Active : Yes  Mode of Transportation: SUV, Car  Occupation: Part time employment  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  [address]: owns a boat, shops, spends time with family and friends, travels, day trips, yardwork - caring for flowers  IADL Comments: does yardwork at times    OBJECTIVE:     Orientation Status:  Orientation  Overall Orientation Status: Within Normal Limits    Observation:  Observation/Palpation  Posture: Good  Observation: Patient alert and oriented and participated well    Cognition Status:  Cognition  Overall Cognitive Status: WNL    Perception Status:  Perception  Overall Perceptual Status: WFL    Vision and Hearing Status:  Vision  Vision Exceptions: Wears glasses for reading  Hearing  Hearing: Within functional limits        GROSS ASSESSMENT AROM/PROM:  AROM: Within functional limits  PROM: Within functional limits      UE STRENGTH:  Strength: Within functional limits    UE COORDINATION:  Coordination: Within functional limits    UE TONE:  Tone: Normal    UE SENSATION:  Sensation: Intact    Hand Dominance:  Hand Dominance  Hand Dominance: Right    ADL Status:  ADL  Feeding: Independent  Grooming: Independent  UE Dressing: Independent  LE Dressing: Independent  Toileting: Independent  Toilet Transfers  Toilet Transfers Comments: Patient declined a need to go stating she had just gone to the bathroom. Anticipate independent as patient performed all other tasks at an independent level    Functional Mobility:  Patient ambulated to/from sink with No device at Independent level.      Bed Mobility  Bed mobility  Supine to Sit: Independent  Sit to Supine: Independent    Seated and Standing Balance:   independent    Functional Endurance:  Activity Tolerance  Activity Tolerance: Patient Tolerated treatment well    D/C Recommendations:  OT D/C RECOMMENDATIONS  REQUIRES OT FOLLOW-UP: No    Equipment Recommendations:  OT Equipment Recommendations  Other: Discussed that because patient owns the shower chair already she could put it in the tub shower so if she got dizzy she could sit down but patient reported that she did not need to do that at this time    OT Education:   Patient Education  Education Given To: Patient  Education Provided: Role of Therapy; Fall Prevention Strategies  Education Provided Comments: Discussed with patient that when she gets dizzy she should attempt to sit down as quickly as possible even if she sits on the ground as it is better to sit down than to fall and possibly injure herself. pateint reported an understanding  Education Method: Verbal  Barriers to Learning: None  Education Outcome: Verbalized understanding    OT Follow Up:   OT D/C RECOMMENDATIONS  REQUIRES OT FOLLOW-UP: No       Assessment/Discharge Disposition:  Assessment: Patient is a 59year old active female who was independent during the evaluation today. Patient reported no concerns for discharge home except a desire to learn what keeps happening to her.  Patient does not require OT services at this time  Prognosis: Good  No Skilled OT: Independent with ADL's, No OT goals identified, Safe to return home  Decision Making: Low Complexity  History: min chart review  Exam: no deficits identified  Assistance / Modification: no modification needed    AMPAC (Six Click) Self care Score   How much help for putting on and taking off regular lower body clothing?: None  How much help for Bathing?: None  How much help for Toileting?: None  How much help for putting on and taking off regular upper body clothing?: None  How much help for taking care of personal

## 2022-07-17 NOTE — PROGRESS NOTES
Physical Therapy Med Surg Initial Assessment  [facility]Department[facility]  [id number]       NAME: Johnny Morocho  : 1960 (50 y.o.)  MRN: [id number]  CODE STATUS: Full Code    Date of Service: 2022    Patient Diagnosis(es): TIA (transient ischemic attack) [G45.9]   Chief Complaint   Patient presents with    Dizziness     Pt c/o dizziness and double vision for past 45 min.      Patient Active Problem List    Diagnosis Date Noted    VBI (vertebrobasilar insufficiency) 2022    TGA (transient global amnesia) 2022    Dizziness 2022    Double vision 2022    TIA (transient ischemic attack) 2022        Past Medical History:   Diagnosis Date    Arthritis     GERD (gastroesophageal reflux disease)     Hiatal hernia     Other disorders of kidney and ureter in diseases classified elsewhere     Pneumonia     Thyroid disease      Past Surgical History:   Procedure Laterality Date    HAND SURGERY Bilateral     HERNIA REPAIR      SHOULDER SURGERY Right        Chart Reviewed: Yes  Patient assessed for rehabilitation services?: Yes  Family / Caregiver Present: Yes ()    SUBJECTIVE:   Subjective: \"I just took a walk\"    Pain  Pain: denies pre/post pain      Prior Level of Function:  Social/Functional History  Lives With: Spouse  Type of Home: House  Home Layout: Two level (~12)  Home Access: Stairs to enter without rails  Entrance Stairs - Number of Steps: 1 small  Bathroom Shower/Tub: Tub/Shower unit  Home Equipment: Ruben Miller, standard, Crutches  ADL Assistance: Independent  Homemaking Assistance: Independent  Homemaking Responsibilities: Yes  Ambulation Assistance: Independent  Transfer Assistance: Independent  Active : Yes  Occupation: Part time employment  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[address]:   Vision  Vision Exceptions: Wears glasses for reading  Hearing: Within functional limits    Cognition:  Overall Orientation Status: Within Normal Limits  Follows Commands: Within Functional Limits  Overall Cognitive Status: WNL    Observation/Palpation  Observation: Patient supine in bed - pleasant and agreeable to evaluation    ROM:  RLE AROM: WFL  LLE AROM : WFL  AROM: Within functional limits  PROM: Within functional limits    Strength:  Strength RLE  Strength RLE: WFL  Comment: Ankle PF/DF grossly >/=3+/5  R Hip Flexion: 5/5  R Knee Flexion: 5/5  R Knee Extension: 4+/5  Strength LLE  Strength LLE: WFL  Comment: Ankle PF/DF grossly >/=3+/5  L Hip Flexion: 5/5  L Knee Flexion: 5/5  L Knee Extension: 4+/5  Strength: Within functional limits    Neuro:  Balance  Posture: Good  Sitting - Static: Good  Sitting - Dynamic: Good  Standing - Static: Good  Standing - Dynamic: Good     Coordination: Within functional limits         Bed mobility  Rolling to Left: Independent  Rolling to Right: Independent  Supine to Sit: Independent  Sit to Supine: Independent    Transfers  Sit to Stand: Independent  Stand to sit: Independent    Ambulation  WB Status: WBAT  Ambulation  Surface: level tile  Device: No Device  Assistance: Independent  Quality of Gait: smooth and consistent ambulation, no deficits noted  Gait Deviations: None  Distance: ~100' + 1 turn    Activity Tolerance  Activity Tolerance: Patient tolerated evaluation without incident    Patient Education  Education Given To: Patient  Education Provided: Role of Therapy;Plan of Care       ASSESSMENT:      DISCHARGE RECOMMENDATIONS:  No Skilled PT: Independent with functional mobility     Assessment: Patient is a 60 yo female who presented with strength and ROM WFL. patient ambulated >/=100' indep without AD. Patient was steady and did not demonstrated any gait deficits. Patient was able to complete x1 turn during ambulation without incident. Patient completed bed mobility and transfers independently in a smooth and consistend fashion without any cuing.  Pt stated she is at 100% with functional mobility and feels safe and comfortable to return [facility] at [facility] in regards to functional mobility. Patient currently not on program.         PLAN OF CARE:  Plan  Plan: Discharge with evaluation only  Plan Comment: patient not on program - functionally independent    Safety Devices  Type of Devices: Left in bed, Call light within reach    Goals:   No goals to report - patient not on program    [facility] (6 CLICK) [place] 28 Inpatient Mobility Raw Score : 24       Therapy Time:   Individual   Time In 1106   Time Out 1115   Minutes [address], 07/17/22 at 11:23 AM         Definitions for assistance levels  Independent = pt does not require any physical supervision or assistance from another person for activity completion. Device may be needed.   Stand by assistance = pt requires verbal cues or instructions from another person, close to but not touching, to perform the activity  Minimal assistance= pt performs 75% or more of the activity; assistance is required to complete the activity  Moderate assistance= pt performs 50% of the activity; assistance is required to complete the activity  Maximal assistance = pt performs 25% of the activity; assistance is required to complete the activity  Dependent = pt requires total physical assistance to accomplish the task

## 2022-07-18 ENCOUNTER — APPOINTMENT (OUTPATIENT)
Dept: ULTRASOUND IMAGING | Age: 62
End: 2022-07-18
Payer: COMMERCIAL

## 2022-07-18 VITALS
RESPIRATION RATE: 20 BRPM | WEIGHT: 150 LBS | TEMPERATURE: 97.7 F | BODY MASS INDEX: 24.11 KG/M2 | OXYGEN SATURATION: 98 % | SYSTOLIC BLOOD PRESSURE: 142 MMHG | HEART RATE: 67 BPM | DIASTOLIC BLOOD PRESSURE: 74 MMHG | HEIGHT: 66 IN

## 2022-07-18 LAB
EKG ATRIAL RATE: 60 BPM
EKG P AXIS: 72 DEGREES
EKG P-R INTERVAL: 158 MS
EKG Q-T INTERVAL: 440 MS
EKG QRS DURATION: 84 MS
EKG QTC CALCULATION (BAZETT): 440 MS
EKG R AXIS: 36 DEGREES
EKG T AXIS: 25 DEGREES
EKG VENTRICULAR RATE: 60 BPM
LV EF: 60 %
LVEF MODALITY: NORMAL
TSH REFLEX: 2.26 UIU/ML (ref 0.44–3.86)

## 2022-07-18 PROCEDURE — 93880 EXTRACRANIAL BILAT STUDY: CPT

## 2022-07-18 PROCEDURE — 6370000000 HC RX 637 (ALT 250 FOR IP): Performed by: INTERNAL MEDICINE

## 2022-07-18 PROCEDURE — 93010 ELECTROCARDIOGRAM REPORT: CPT | Performed by: INTERNAL MEDICINE

## 2022-07-18 PROCEDURE — 36415 COLL VENOUS BLD VENIPUNCTURE: CPT

## 2022-07-18 PROCEDURE — 99226 PR SBSQ OBSERVATION CARE/DAY 35 MINUTES: CPT | Performed by: PSYCHIATRY & NEUROLOGY

## 2022-07-18 PROCEDURE — 95816 EEG AWAKE AND DROWSY: CPT

## 2022-07-18 PROCEDURE — 84443 ASSAY THYROID STIM HORMONE: CPT

## 2022-07-18 PROCEDURE — 93306 TTE W/DOPPLER COMPLETE: CPT

## 2022-07-18 PROCEDURE — G0378 HOSPITAL OBSERVATION PER HR: HCPCS

## 2022-07-18 PROCEDURE — 96372 THER/PROPH/DIAG INJ SC/IM: CPT

## 2022-07-18 PROCEDURE — APPSS30 APP SPLIT SHARED TIME 16-30 MINUTES: Performed by: NURSE PRACTITIONER

## 2022-07-18 RX ADMIN — ASPIRIN 81 MG: 81 TABLET, COATED ORAL at 13:20

## 2022-07-18 RX ADMIN — METHIMAZOLE 5 MG: 5 TABLET ORAL at 05:32

## 2022-07-18 RX ADMIN — ESCITALOPRAM OXALATE 10 MG: 10 TABLET ORAL at 13:32

## 2022-07-18 RX ADMIN — PANTOPRAZOLE SODIUM 40 MG: 40 TABLET, DELAYED RELEASE ORAL at 05:32

## 2022-07-18 RX ADMIN — CALCIUM 1000 MG: 500 TABLET ORAL at 13:20

## 2022-07-18 ASSESSMENT — ENCOUNTER SYMPTOMS
NAUSEA: 0
COUGH: 0
COLOR CHANGE: 0
TROUBLE SWALLOWING: 0
SHORTNESS OF BREATH: 0
ABDOMINAL DISTENTION: 0
CHEST TIGHTNESS: 0
VOMITING: 0
WHEEZING: 0
ABDOMINAL PAIN: 0

## 2022-07-18 NOTE — PROGRESS NOTES
General: She is not in acute distress. Appearance: She is not diaphoretic. HENT:      Head: Normocephalic and atraumatic. Eyes:      Extraocular Movements: Extraocular movements intact. Pupils: Pupils are equal, round, and reactive to light. Cardiovascular:      Rate and Rhythm: Normal rate and regular rhythm. Pulmonary:      Effort: Pulmonary effort is normal. No respiratory distress. Breath sounds: Normal breath sounds. Abdominal:      General: Bowel sounds are normal.      Palpations: Abdomen is soft. Skin:     General: Skin is warm and dry. Neurological:      General: No focal deficit present. Mental Status: She is alert and oriented to person, place, and time. Mental status is at baseline. Cranial Nerves: No cranial nerve deficit. Motor: No weakness, tremor, atrophy, abnormal muscle tone, seizure activity or pronator drift.       Coordination: Coordination normal. Finger-Nose-Finger Test normal.      Gait: Gait normal.       Is remained stable without any dizzy spells overnight`      Medications:  Reviewed    Infusion Medications:   Scheduled Medications:    enoxaparin  40 mg SubCUTAneous Daily    aspirin  81 mg Oral Daily    Or    aspirin  300 mg Rectal Daily    atorvastatin  80 mg Oral Nightly    escitalopram  10 mg Oral Daily    methIMAzole  5 mg Oral Daily    pantoprazole  40 mg Oral QAM AC    calcium elemental  1,000 mg Oral Daily     PRN Meds: ondansetron **OR** ondansetron, polyethylene glycol, labetalol, zolpidem    Labs:   Recent Labs     07/16/22  1345 07/17/22  0453   WBC 5.1 4.3*   HGB 14.5 14.4   HCT 43.7 43.2    214     Recent Labs     07/16/22  1345      K 4.5      CO2 26   BUN 14   CREATININE 0.99*   CALCIUM 9.9     Recent Labs     07/16/22  1345   AST 20   ALT 19   BILITOT 0.7   ALKPHOS 78     Recent Labs     07/16/22  1345   INR 1.0     Recent Labs     07/16/22  1345   TROPONINI <0.010       Urinalysis:   No results found for: Lennie Merl, BACTERIA, RBCUA, BLOODU, [address], [address] [place] [address]    Radiology:   Most recent    EEG No valid procedures specified. MRI of Brain No results found for this or any previous visit. Results for orders placed during the hospital encounter of 07/16/22    MRI BRAIN WO CONTRAST    Narrative  EXAM:    MRI of the brain without contrast    MRA of the brain without contrast    History: CVA. Dizziness. Technique: Multiplanar multisequence MRI of the brain was performed without contrast. Axial 3-D time-of-flight images of the brain were obtained without contrast. 3-D volume rendered images were performed for evaluation of the cerebral vasculature. Comparison: CT brain July 16, 2022    Findings:    MRI brain:    Foci of white matter signal abnormality within the supratentorial white matter are nonspecific but most compatible with chronic small vessel ischemic changes in a patient of this age. Prominence of the sulci and ventricles compatible with mild to moderate generalized parenchymal volume loss. No edema, hemorrhage, mass, mass effect, midline shift, or abnormal extra-axial fluid collection. Midline structures are within normal limits. The posterior fossa is within normal limits. There is no diffusion restriction. No susceptibility artifact is identified on the gradient echo sequence. Cranial nerves 7/8 complexes appear grossly unremarkable. The visualized paranasal sinuses and bilateral mastoid air cells are clear. MRA brain:    The bilateral distal cervical internal carotid arteries through the skull base are patent. The bilateral middle cerebral arteries through the trifurcation and opercular branches are patent. The vertebrobasilar system including the superior cerebellar and posterior cerebral arteries are patent. Posterior communicating arteries are patent. The bilateral anterior cerebral arteries and anterior communicating artery are patent.     No aneurysm or high-grade stenosis of the visualized cerebral vasculature. Impression  MRI brain:    No acute ischemia or acute intracranial process. Generalized parenchymal volume loss and nonspecific white matter findings most compatible with chronic small vessel ischemic changes in a patient of this age. MRA brain:    No aneurysm or high-grade stenosis of the visualized cerebral vasculature. MRA of the Head and Neck: No results found for this or any previous visit. No results found for this or any previous visit. Results for orders placed during the hospital encounter of 07/16/22    mra head w/o contrast    Narrative  EXAM:    MRI of the brain without contrast    MRA of the brain without contrast    History: CVA. Dizziness. Technique: Multiplanar multisequence MRI of the brain was performed without contrast. Axial 3-D time-of-flight images of the brain were obtained without contrast. 3-D volume rendered images were performed for evaluation of the cerebral vasculature. Comparison: CT brain July 16, 2022    Findings:    MRI brain:    Foci of white matter signal abnormality within the supratentorial white matter are nonspecific but most compatible with chronic small vessel ischemic changes in a patient of this age. Prominence of the sulci and ventricles compatible with mild to moderate generalized parenchymal volume loss. No edema, hemorrhage, mass, mass effect, midline shift, or abnormal extra-axial fluid collection. Midline structures are within normal limits. The posterior fossa is within normal limits. There is no diffusion restriction. No susceptibility artifact is identified on the gradient echo sequence. Cranial nerves 7/8 complexes appear grossly unremarkable. The visualized paranasal sinuses and bilateral mastoid air cells are clear. MRA brain:    The bilateral distal cervical internal carotid arteries through the skull base are patent.     The bilateral middle cerebral arteries is intact. Impression  There is no acute intracranial process. No results found for this or any previous visit. No results found for this or any previous visit. Carotid duplex: No results found for this or any previous visit. No results found for this or any previous visit. No results found for this or any previous visit. Echo No results found for this or any previous visit. Assessment/Plan:    7/17/22:  Dizziness with the possibility of vertebrobasilar insufficiency and MRI of the brain and intracranial MRA and carotid ultrasounds are pending. Patient has a history of palpitations and may require cardiac monitoring. She has an appointment to see a cardiologist whom she has [facility].  The only other red flag here is that she does not recall events and therefore we will obtain a EEG. She has an asymmetric reflex to the left and we await an MRI to see if there is any temporal lobe scarring. This then can suggest seizure. Consultation includes my consultation the emergency room. 7/18/22:  MRI of the brain negative for acute findings. No mention of encephalomalacia. MRA of the head negative for high-grade stenosis. Carotid duplex pending  EEG pending  Orthostatic blood pressures negative.   Hemoglobin A1c 5.5  Given her history of hyperthyroidism and recurrent palpitations will order thyroid studies. She is currently on Tapazole. If carotid duplex is negative patient can be discharged from neurology standpoint. She can follow-up with current providers at [facility].    I have personally performed a face to face diagnostic evaluation on this patient, reviewed all data and investigations, and am the sole provider of all clinical decisions on the neurological status of this patient. And examined and no changes noted she is not any further dizzy spells. MRI of the brain was negative MRA was negative. Carotid ultrasounds are pending when seen. Patient recommend to be discharged if carotid ultrasounds are negative. More than 60% time spent evaluating this patient myself      Juan Mcleod MD, [address], American Board of Psychiatry & Neurology  Board Certified in Vascular Neurology  Board Certified in Neuromuscular Medicine  Certified in Neurorehabilitation     Collaborating physicians: Dr Marjorie Mcleod    Electronically signed by Idalia Klinefelter, APRN - CNP on 7/18/2022 at 11:29 AM

## 2022-07-18 NOTE — CARE COORDINATION
MET W/PT TO DISCUSS DISCHARGE PLAN AND ASSESS NEEDS. PT DENIES NEEDS. PLAN REMAINS HOME W/SPOUSE. WILL FOLLOW.

## 2022-07-18 NOTE — DISCHARGE SUMMARY
Hospital Medicine Discharge Summary    Cherri Kennedy  :  1960  MRN:  [id number]    Admit date:  2022  Discharge date:  2022    Admitting Physician:  Soniya Luther MD  Primary Care Physician:  Zuleika Medina MD    Discharge Diagnoses:    Dizziness    Chief Complaint   Patient presents with    Dizziness     Pt c/o dizziness and double vision for past 45 min. Hospital Course:   Patient presented with dizziness concerning for possible vertebrobasilar insufficiency. MRI was negative so neurology recommended discharging home with outpatient follow up with her current neurologist if her carotid ultrasounds are negative as well. Exam on discharge:   BP (!) 142/74   Pulse 67   Temp 97.7 °F (36.5 °C)   Resp 20   Ht 5' 6\" (1.676 m)   Wt 150 lb (68 kg)   SpO2 98%   BMI 24.21 kg/m²   General appearance: No apparent distress, appears stated age and cooperative. HEENT: Conjunctivae/corneas clear. Neck: Trachea midline. Respiratory:  Normal respiratory effort. Clear to auscultation  Cardiovascular: Regular rate and rhythm   Abdomen: Soft, non-tender, non-distended with normal bowel sounds. Musculoskeletal: No clubbing, cyanosis or edema bilaterally.     Neuro: Non Focal.   Capillary Refill: Brisk,< 3 seconds   Peripheral Pulses: +2 palpable, equal bilaterally     Patient was seen by the following consultants   Consults:  IP CONSULT TO NEUROLOGY  IP CONSULT TO CASE MANAGEMENT  IP CONSULT TO DIETITIAN  IP CONSULT TO SOCIAL WORK  IP CONSULT TO SPIRITUAL SERVICES    Significant Diagnostic Studies:    Refer to chart     Please refer to chart if no studies are shown here    CT HEAD WO CONTRAST    Result Date: 2022  EXAMINATION: CT HEAD WO CONTRAST, 2022 2:01 PM CLINICAL HISTORY:  dizziness blurred vision COMPARISON: None TECHNIQUE:  Multiple contiguous axial images of the head were obtained from the skull base through the skull vertex without intravenous contrast. Sagittal and coronal 3D reformats have been produced. All CT scans at this facility use dose modulation, iterative reconstruction, and/or weight based dosing when appropriate to reduce radiation dose to as low as reasonably achievable. BRAIN CT FINDINGS: Gray-white matter differentiation is maintained. No acute hemorrhage, mass, mass effect, or midline shift. There is no evidence of atrophy, ventricular morphology is within normal limits. The subcortical and periventricular white matter is within normal limits. The basal ganglia are within normal limits. There are no acute changes or space-occupying lesions in the posterior fossa. The visualized portions of the orbits are within normal limits. The globes are intact. The imaged portions of the paranasal sinuses are unremarkable. The calvarium is intact. There is no acute intracranial process. mra head w/o contrast    Result Date: 7/18/2022  EXAM: MRI of the brain without contrast MRA of the brain without contrast History: CVA. Dizziness. Technique: Multiplanar multisequence MRI of the brain was performed without contrast. Axial 3-D time-of-flight images of the brain were obtained without contrast. 3-D volume rendered images were performed for evaluation of the cerebral vasculature. Comparison: CT brain July 16, 2022 Findings: MRI brain: Foci of white matter signal abnormality within the supratentorial white matter are nonspecific but most compatible with chronic small vessel ischemic changes in a patient of this age. Prominence of the sulci and ventricles compatible with mild to moderate generalized parenchymal volume loss. No edema, hemorrhage, mass, mass effect, midline shift, or abnormal extra-axial fluid collection. Midline structures are within normal limits. The posterior fossa is within normal limits. There is no diffusion restriction. No susceptibility artifact is identified on the gradient echo sequence. Cranial nerves 7/8 complexes appear grossly unremarkable.   The of this age. Prominence of the sulci and ventricles compatible with mild to moderate generalized parenchymal volume loss. No edema, hemorrhage, mass, mass effect, midline shift, or abnormal extra-axial fluid collection. Midline structures are within normal limits. The posterior fossa is within normal limits. There is no diffusion restriction. No susceptibility artifact is identified on the gradient echo sequence. Cranial nerves 7/8 complexes appear grossly unremarkable. The visualized paranasal sinuses and bilateral mastoid air cells are clear. MRA brain: The bilateral distal cervical internal carotid arteries through the skull base are patent. The bilateral middle cerebral arteries through the trifurcation and opercular branches are patent. The vertebrobasilar system including the superior cerebellar and posterior cerebral arteries are patent. Posterior communicating arteries are patent. The bilateral anterior cerebral arteries and anterior communicating artery are patent. No aneurysm or high-grade stenosis of the visualized cerebral vasculature. MRI brain: No acute ischemia or acute intracranial process. Generalized parenchymal volume loss and nonspecific white matter findings most compatible with chronic small vessel ischemic changes in a patient of this age. MRA brain: No aneurysm or high-grade stenosis of the visualized cerebral vasculature.       Discharge Medications:         Medication List        CONTINUE taking these medications      calcium carbonate 1250 (500 Ca) MG tablet  Commonly known as: OYSTER SHELL CALCIUM 500 mg     escitalopram 10 MG tablet  Commonly known as: LEXAPRO     methIMAzole 5 MG tablet  Commonly known as: TAPAZOLE     pantoprazole sodium 40 MG Pack packet  Commonly known as: PROTONIX     therapeutic multivitamin-minerals tablet     zolpidem 10 MG tablet  Commonly known as: AMBIEN              Disposition:   If discharged to Home, Any [facility] needs that were indicated and/or required as been addressed and set up by Social Work. Condition at discharge: good     Activity: activity as tolerated    Total time taken for discharging this patient: 40 minutes. Greater than 70% of time was spent focused exclusively on this patient. Time was taken to review chart, discuss plans with consultants, reconciling medications, discussing plan answering questions with patient.      Signed:  Jeanette Esposito MD  7/18/2022, 3:24 PM  ----------------------------------------------------------------------------------------------------------------------    Omar Méndez

## 2022-07-19 PROCEDURE — 95816 EEG AWAKE AND DROWSY: CPT | Performed by: PSYCHIATRY & NEUROLOGY

## 2022-07-19 NOTE — PROCEDURES
[address]                      [address], [address] [facility]                          ELECTROENCEPHALOGRAM REPORT    PATIENT NAME: Bishop Shankar                        :        1960  MED REC NO:   [id number]                            ROOM:       [facility]  ACCOUNT NO:   [de-identified]                           ADMIT DATE: 2022  PROVIDER:     Cassidy Vidal MD    DATE OF EE2022    Leisa Hernandez MD    D: 2022 11:37:32       T: 2022 11:39:57     [id number]  Job#: [id number]     Doc#: [id number]    CC:

## 2022-08-17 NOTE — PROCEDURES
[address]                      [address], [address] [facility]                          ELECTROENCEPHALOGRAM REPORT    PATIENT NAME: Rohith Zaldivar                        :        1960  MED REC NO:   [id number]                            ROOM:       [facility]  ACCOUNT NO:   [de-identified]                           ADMIT DATE: 2022  PROVIDER:     Stephanie Retana MD    DATE OF EE2022    MEDICATIONS:  Lovenox, aspirin, Lexapro. EEG FINDINGS:  This is a spontaneous 21-channel EEG recording for this  patient with questionable seizures. The background rhythm of this EEG  shows a well regulated 8 Hz posterior dominant rhythm of alpha. This is  symmetrical and attenuates with eye opening. EKG is monitored, this is  regular. Photic stimulation is performed without any photic responses. There is no photo responsive seizures. Hyperventilation is performed  with good effort without any change in frequencies. The record remains  symmetrical throughout without any asymmetries or epileptiform  discharge. Drowsy patterns are intermixed. IMPRESSION:  This is a normal well-regulated EEG recording. Clinical  correlation is recommended.         Balbir Ovalle MD    D: 2022 11:37:32       T: 2022 12:00:18     [id number]  Job#: [id number]     Doc#: [id number]

## 2023-06-16 ENCOUNTER — HOSPITAL ENCOUNTER (OUTPATIENT)
Dept: DATA CONVERSION | Facility: HOSPITAL | Age: 63
End: 2023-06-16
Attending: OTOLARYNGOLOGY | Admitting: OTOLARYNGOLOGY
Payer: COMMERCIAL

## 2023-06-16 DIAGNOSIS — Z79.02 LONG TERM (CURRENT) USE OF ANTITHROMBOTICS/ANTIPLATELETS: ICD-10-CM

## 2023-06-16 DIAGNOSIS — J39.2 OTHER DISEASES OF PHARYNX: ICD-10-CM

## 2023-06-16 DIAGNOSIS — Z79.82 LONG TERM (CURRENT) USE OF ASPIRIN: ICD-10-CM

## 2023-06-16 DIAGNOSIS — Z87.891 PERSONAL HISTORY OF NICOTINE DEPENDENCE: ICD-10-CM

## 2023-06-16 DIAGNOSIS — E78.5 HYPERLIPIDEMIA, UNSPECIFIED: ICD-10-CM

## 2023-06-16 DIAGNOSIS — F32.A DEPRESSION, UNSPECIFIED: ICD-10-CM

## 2023-06-16 DIAGNOSIS — Z88.0 ALLERGY STATUS TO PENICILLIN: ICD-10-CM

## 2023-06-16 DIAGNOSIS — E05.90 THYROTOXICOSIS, UNSPECIFIED WITHOUT THYROTOXIC CRISIS OR STORM: ICD-10-CM

## 2023-06-16 DIAGNOSIS — K21.9 GASTRO-ESOPHAGEAL REFLUX DISEASE WITHOUT ESOPHAGITIS: ICD-10-CM

## 2023-06-16 DIAGNOSIS — F41.9 ANXIETY DISORDER, UNSPECIFIED: ICD-10-CM

## 2023-06-16 DIAGNOSIS — R13.10 DYSPHAGIA, UNSPECIFIED: ICD-10-CM

## 2023-09-07 VITALS
DIASTOLIC BLOOD PRESSURE: 69 MMHG | SYSTOLIC BLOOD PRESSURE: 145 MMHG | HEART RATE: 63 BPM | TEMPERATURE: 98.1 F | RESPIRATION RATE: 16 BRPM

## 2023-09-30 NOTE — H&P
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 Given this, decision made to proceed  to operating room for MDL, pharyngoscopy, esophagoscopy, botox injection of cricopharyngeus muscle. No changes since outpatient ENT visit. No other relevant history at this time.     Allergies:        Allergies:  ·  penicillin : Unknown    Home Medication Review:   Home Medications Reviewed: yes     Impression/Procedure:   ·  Impression and Planned Procedure: MDL, esophagoscopy, botox infection, possible dilation       ERAS (Enhanced Recovery After Surgery):  ·  ERAS Patient: no       Vital Signs:  Temperature C: 36.7 degrees C   Temperature F: 98 degrees F   Heart Rate: 63 beats per minute   Respiratory Rate: 16 breath per minute   Blood Pressure Systolic: 145 mm/Hg   Blood Pressure Diastolic: 69 mm/Hg     Physical Exam by System:    Constitutional: Well developed, awake/alert/oriented  x3, no distress, alert and cooperative   Eyes: Sclera nonicteric.  EOMI   Head/Neck: Neck supple, no apparent injury, No JVD,  trachea midline   Respiratory/Thorax: Good chest expansion, thorax  symmetric.  Breathing unlabored   Cardiovascular: No signs of peripheral edema or cyanosis   Extremities: Moving all extremities spontaneously.   No evidence of clubbing or contracture   Neurological: alert and oriented x3, intact senses,  normal strength   Psychological: Appropriate mood and behavior   Skin: Warm and dry, no lesions, no rashes     Consent:   COVID-19 Consent:  ·  COVID-19 Risk Consent Surgeon has reviewed key risks related to the risk of tana COVID-19 and if they contract COVID-19 what the risks are.     Attestation:   Note Completion:  I am a:  Resident/Fellow   Attending Attestation I saw and evaluated the patient.  I personally obtained the key and  critical portions of the history and physical exam or was physically present for key and  critical portions performed by the resident/fellow. I reviewed the resident/fellow?s documentation and discussed the patient with the resident/fellow.  I agree with the resident/fellow?s medical decision making as documented in the note.     I personally evaluated the patient on 16-Jun-2023   Comments/ Additional Findings    Patient and I discussed the risks, benefits and alternatives to surgery and all questions answered.  Cleared to OR.          Electronic Signatures:  Kofi Bee (Resident))  (Signed 16-Jun-2023 11:50)   Authored: History of Present Illness, Allergies, Home  Medication Review, Impression/Procedure, ERAS, Physical Exam, Consent, Note Completion  Reed Vernon)  (Signed 16-Jun-2023 21:44)   Authored: Note Completion   Co-Signer: History of Present Illness, Allergies, Home Medication Review, Impression/Procedure, ERAS, Physical Exam, Consent, Note Completion      Last Updated: 16-Jun-2023 21:44 by Reed Vernon)

## 2023-10-02 NOTE — OP NOTE
PROCEDURE DETAILS    Preoperative Diagnosis:  Dysphagia  Postoperative Diagnosis:  Dysphagia  Surgeon: TANG Vernon MD  Resident/Fellow/Other Assistant: Kofi Bee MD    Procedure:  1. Direct laryngoscopy/pharyngoscopy  2. Balloon dilation of proximal esophagus, hypopharynx  3. Botox injection into cricopharyngeus  Estimated Blood Loss: 5  Findings: 1. Normal larynx  2. Redundant, mildly lumpy-appearing mucosa overlying the cricopharyngeus muscle. Slight bleeding.   3. 27.5U botox injected into cricopharyngeus posteriorly  Specimens(s) Collected: no,     Patient Returned To/Condition: PACU/Satisfactory        Operative Report:   Description of Procedure:  The patient was seen in the preoperative setting and written informed consent was obtained. The patient was then taken back to the operating room and transferred to the operating room table. A preoperative timeout was then performed by Dr. Vernon. The  patient was then successfully sedated and intubated by the anesthesia team. The head of the bed was then rotated 90 degrees.    A tooth guard was placed on the maxillary dentition.  The Dedo laryngoscope was inserted into the oral cavity and events of the larynx.  Vocal cords visualized without any obvious pathology noted.  The Dedo laryngoscope was then pulled back partially  and position underneath the endotracheal tube.  The Dedo scope was then advanced to the level of the cricopharyngeus and the patient was placed in suspension.  The 0 degree endoscope was inserted in the proximal esophagus was visualized.  There was a  prominent cricopharyngeal muscle noted.  2 separate injections of Botox were performed posteriorly, taking care to avoid any anterior displacement of the Botox towards the posterior cricoarytenoid muscle.  A total of 27.5 units were injected into the  cricopharyngeus muscle.  A balloon dilation was then performed in the proximal esophagus and then over the cricopharyngeus muscle.  Small amount of bleeding was controlled with epinephrine pledgets. Scope was withdrawn and tooth guard was removed.     The patient was then handed over the anesthesia team and successfully extubated. The patient was taken to the postoperative anesthesia care unit.  She tolerated the procedure well and there were no complications. Dr. Vernon was present for the critical portions of the procedure..                        Attestation:   Note Completion:  Attending Attestation I was present for the entire procedure    I am a: Resident/Fellow         Electronic Signatures:  Kofi Bee (Resident))  (Signed 16-Jun-2023 12:29)   Authored: Post-Operative Note, Chart Review, Note Completion  Reed Vernon)  (Signed 16-Jun-2023 22:29)   Authored: Post-Operative Note, Chart Review, Note Completion   Co-Signer: Post-Operative Note, Chart Review, Note Completion      Last Updated: 16-Jun-2023 22:29 by Reed Vernon)

## 2023-10-03 PROBLEM — R07.89 CHEST PRESSURE: Status: ACTIVE | Noted: 2023-10-03

## 2023-10-03 PROBLEM — R91.1 LUNG NODULE: Status: ACTIVE | Noted: 2023-10-03

## 2023-10-03 PROBLEM — R49.0 HOARSENESS: Status: ACTIVE | Noted: 2023-10-03

## 2023-10-03 PROBLEM — R13.19 CERVICAL DYSPHAGIA: Status: ACTIVE | Noted: 2023-10-03

## 2023-10-03 PROBLEM — H57.89 EYE INFLAMMATION: Status: ACTIVE | Noted: 2023-10-03

## 2023-10-03 PROBLEM — S05.00XA CORNEAL ABRASION: Status: ACTIVE | Noted: 2023-10-03

## 2023-10-03 PROBLEM — K21.9 GERD (GASTROESOPHAGEAL REFLUX DISEASE): Status: ACTIVE | Noted: 2023-10-03

## 2023-10-03 RX ORDER — METHIMAZOLE 5 MG/1
1 TABLET ORAL DAILY
COMMUNITY

## 2023-10-03 RX ORDER — ESCITALOPRAM OXALATE 20 MG/1
20 TABLET ORAL DAILY
COMMUNITY

## 2023-10-03 RX ORDER — CEPHALEXIN 500 MG/1
1 CAPSULE ORAL 2 TIMES DAILY
COMMUNITY
Start: 2023-03-15

## 2023-10-03 RX ORDER — ZOLPIDEM TARTRATE 10 MG/1
1 TABLET ORAL
COMMUNITY

## 2023-10-03 RX ORDER — SULFAMETHOXAZOLE AND TRIMETHOPRIM 400; 80 MG/1; MG/1
1 TABLET ORAL 2 TIMES DAILY
COMMUNITY
Start: 2022-11-09

## 2023-10-03 RX ORDER — ACETAMINOPHEN 500 MG
1 TABLET ORAL DAILY
COMMUNITY
End: 2023-10-06 | Stop reason: HOSPADM

## 2023-10-03 RX ORDER — TOBRAMYCIN 3 MG/ML
SOLUTION/ DROPS OPHTHALMIC
COMMUNITY
Start: 2022-10-17

## 2023-10-03 RX ORDER — BENZONATATE 100 MG/1
1 CAPSULE ORAL 3 TIMES DAILY PRN
COMMUNITY
Start: 2023-03-15

## 2023-10-03 RX ORDER — NEOMYCIN SULFATE, POLYMYXIN B SULFATE, AND DEXAMETHASONE 3.5; 10000; 1 MG/G; [USP'U]/G; MG/G
OINTMENT OPHTHALMIC
COMMUNITY
Start: 2022-10-24

## 2023-10-03 RX ORDER — PANTOPRAZOLE SODIUM 40 MG/1
40 TABLET, DELAYED RELEASE ORAL
COMMUNITY
Start: 2022-08-31

## 2023-10-03 RX ORDER — ROSUVASTATIN CALCIUM 10 MG/1
10 TABLET, COATED ORAL DAILY
COMMUNITY

## 2023-10-03 RX ORDER — IBUPROFEN 100 MG/5ML
1 SUSPENSION, ORAL (FINAL DOSE FORM) ORAL DAILY
COMMUNITY
End: 2023-10-06 | Stop reason: HOSPADM

## 2023-10-04 ENCOUNTER — APPOINTMENT (OUTPATIENT)
Dept: OTOLARYNGOLOGY | Facility: HOSPITAL | Age: 63
End: 2023-10-04

## 2023-10-06 ENCOUNTER — OFFICE VISIT (OUTPATIENT)
Dept: OTOLARYNGOLOGY | Facility: HOSPITAL | Age: 63
End: 2023-10-06
Payer: COMMERCIAL

## 2023-10-06 VITALS — HEIGHT: 66 IN | BODY MASS INDEX: 25.34 KG/M2 | WEIGHT: 157.7 LBS

## 2023-10-06 DIAGNOSIS — R13.10 DYSPHAGIA, UNSPECIFIED TYPE: Primary | ICD-10-CM

## 2023-10-06 DIAGNOSIS — R49.0 DYSPHONIA: ICD-10-CM

## 2023-10-06 PROCEDURE — 99215 OFFICE O/P EST HI 40 MIN: CPT | Performed by: OTOLARYNGOLOGY

## 2023-10-06 PROCEDURE — 31579 LARYNGOSCOPY TELESCOPIC: CPT | Performed by: OTOLARYNGOLOGY

## 2023-10-06 ASSESSMENT — ENCOUNTER SYMPTOMS
LOSS OF SENSATION IN FEET: 0
DEPRESSION: 0
OCCASIONAL FEELINGS OF UNSTEADINESS: 0

## 2023-10-06 ASSESSMENT — PAIN SCALES - GENERAL: PAINLEVEL: 0-NO PAIN

## 2023-10-06 NOTE — PROGRESS NOTES
Patient: Albania Soni  MRN: [id number] Sex: female Age: 62 y.o.  Date of Service: 10/6/2023 YOB: 1960      ASSESSMENT AND PLAN  I discussed the findings with Albania Soni and have recommended the followin. Dysphagia to solids and liquids s/p MDL with CP botox injection total 27.5U and balloon dilation on 23 with Dr. Vernon without benefit. She has had an extensive workup for GERD, which has been normal. Manometry also read as normal however, her swallows appear somewhat fragmented, and her esophageal follow-through on her MBS reveals stasis of contrast in the cervical esophagus, which could explain her symptoms. 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 She has not been evaluated for EoE as a cause of her dysphagia.   - Counseled on esophageal diet  - Recommend esophagoscopy with biopsies to evaluate for EoE, repeat balloon dilation to 30+mm. Risks, benefits, and alternatives of esophagoscopy and dilation discussed with the patient, including but not limited to bleeding, infection, pain, need for repeat procedure, no improvement in symptoms, and rarely, esophageal perforation. The patient expressed understanding and agrees to proceed.   - Will also discuss her case further with my foregut colleagues    2. Dysphonia x 2 years, no masses or lesions on endoscopy. 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  - Consider voice therapy when amenable      CHIEF COMPLAINT  Chief Complaint   Patient presents with    New Patient Visit     Referred by  (throat problems)       HISTORY OF PRESENT ILLNESS  Albania Soni is a 62 y.o. female referred by Reed Mabry MD for evaluation of dysphagia.  The patient reports she constantly feels like she has something stuck in her throat, she can't  swallow well, feels as though foods and liquids go down really slow. She coughs and feels like she is going to throw up. She also reports difficulty burping for about 1 year. She sometimes can make herself burp but cannot burp if she needs to. Also has had voice change for about 2 years, it is raspy and hoarse.    She notes her issues first started around Oct 2021 beginning with a hoarse voice. She was treated with antibiotics for presumed pneumonia but symptoms were persistent. She then had progression of her symptoms to include nausea and dry heaves. Referred to local pulmonologist who obtained CT chest that showed a small pulmonary nodule. She also saw a local GI physician Dr. Driver who diagnosed and treated her for H Pylori. Then saw Dr. Darin Shannon who recommended radiographic surveillance for her nodule. She was then referred to Dr. Bravo for manometry and pH testing, performed 12/21/22 which were both normal without significant acid reflux. Gastric emptying study 1/10/23 also normal. She then saw Dr. Vernon for possible CP issues and underwent MDL with CP botox injection total 27.5U and balloon dilation (diameter unreported) on 6/16/23. She reports this did not help.     The dysphagia history includes:      Dysphagia for solids               yes    Dysphagia for liquids              yes    Dysphagia for pills                  no  Associated weight loss            no    Recent pneumonia/bronchitis  no  GERD/Acid reflux   no    No history of autoimmune or rheumatologic issues.     Prior Dr. Vernon Notes:  This is a 62 year old female with a history of an interesting swallow dysfunction s/p CP Botox and dilation. Of note she had a manometry that shows normal peristalsis. She had pH impedance that showed no significant reflux. She has hiatal hernia with reduced tone of the LES and occasional reflux symptoms. Despite prior manometry, the esophageal dysmotility was notable on esophagram. She has oropharyngeal  "symptoms as well with persistence seen previous scope exam.     We discussed additional options that may be helpful to include repeat manometry, or consideration of endoflip that may be helpful in determining the esophageal motility. We recommended seeing my colleague for further discussion of this.         ADDITIONAL HISTORY  Past Medical History  She has no past medical history on file. Surgical History  She has a past surgical history that includes Other surgical history (10/13/2022) and MR angio head wo IV contrast (7/17/2022).   Social History  She reports that she does not have a smoking history on file. She has never used smokeless tobacco. She reports that she does not currently use alcohol. She reports that she does not use drugs. Allergies  Penicillins     Family History  No family history on file.     REVIEW OF SYSTEMS  All 10 systems were reviewed and negative except for above.      PHYSICAL EXAM  ENT Physical Exam     General: Well-nourished and developed, alert and appropriate, no distress, voice [id number]    Respiratory: Breathing quietly, no stridor    Eyes:  Pupils reactive, sclera clear, external ocular muscles intact, no nystagmus.      Ears:  Pinnae normal. External auditory canals clear and tympanic membranes intact.    Nasal cavity:  No anterior lesions, masses or polyps.    Oral cavity/oropharynx:  Buccal mucosa is moist without lesions or masses, tongue midline and palate elevates symmetrically.    Neck:  Soft. There is no lymphadenopathy or thyromegaly.      Neurologic:  Cranial nerves II-XII grossly intact.       Last Recorded Vitals  Height 1.676 m (5' 6\"), weight 71.5 kg (157 lb 11.2 oz).    RESULTS    Patient Reported Outcome Measures  N/A    Laboratory, Radiology, and Pathology  I personally reviewed the following results, with the following interpretation:   Manometry 12/21/22: Report is normal, but on my review her swallows appear somewhat fragmented.   The LES pressure is low at 9.3 " mmHg and it appears to relax normally with a normal median IRP at 1.5mmHg. There is evidence of a very small hiatal hernia. The study of the esophageal peristalsis shows normal esophageal motility in all of the liquid swallows. The mean DCI is normal at 2962 mmHg-cm-sec. There was complete bolus clearance in 100 % of swallows.    pH testing 12/21/22: DeMeester 4.7  - The total acid exposure time was 0.8 % and the DeMeester score was 4.7 indicating absence of acid reflux  - There was a total of 47 reflux episodes of which 9 were proximal episodes  - There was a total of 21 weakly acid reflux episodes which is consider   normal  - There were a total of 13 symptoms reported (heartburn, chets pain, cough) with a SAP of 80% indicatig poor symptom correlation    Gastric emptying study 1/10/23 - no gastroparesis    Esophagram 8/28/23 - LES appears to relax normally, unable to evaluate motility due to limited images/video    MBS 8/28/23 - mild osteophytes, small CP web; however there is evidence of delayed emptying of the cervical esophagus as there is stasis of contrast post-swallow         PROCEDURES  Procedures     Flexible Fiberoptic Laryngoscopy with Stroboscopy    Patient failed a mirror exam due to limitations of equipment and the need for stroboscopy to assess glottic vibration and closure.     PREOPERATIVE DIAGNOSIS: Dysphonia    POSTOPERATIVE DIAGNOSIS: Same    PROCEDURE:  Strobovideolaryngoscopy    ANESTHESIA:  Topical    COMPLICATIONS:  None    SPECIMENS:  None    PROCEDURE IN DETAIL: The patient was seated in an upright position.  The nasal cavity was topically decongested and anesthetized.  The stroboscopic microphone was held to the neck at the level of the larynx.  The distal chip video laryngoscope was passed through the nasal cavity.  The nasal cavity and nasopharynx were within normal limits.  The base of tongue revealed no lesions or masses. The following findings on stroboscopy were  noted:      Appearance of pharynx/larynx/Other Structural Lesions: no masses or lesions   Vocal Fold Mobility               Right VF: mobile               Left VF: mobile   TVF Appearance               Edema/Erythema: none, small prominent vessels superior surface               Lesions/vibratory margin irregularities: mild atrophy and fullness at the striking zone   Glottic Closure Pattern: slit hourglass    Vibration:               Phase: normal    Periodicity: regular               Amplitude: slightly increased                Waveform: asymmetric L/R      Muscle Tension Patterns:  moderate AP and mild false fold compression   Other abnormal findings: none    The patient tolerated the procedure well.       ----------------------------------------------------------------------  Jillian Hernandez MD, MAEd    [facility]  Department of Otolaryngology - Head and Neck Surgery  [facility]    The total time I spent in care of this patient today (excluding time spent on other billable services) is as follows:    Time Spent  Prep time on day of patient encounter: 15 minutes  Time spent directly with patient, family or caregiver: 30 minutes  Additional Time Spent on Patient Care Activities: 15 minutes  Documentation Time: 15 minutes  Other Time Spent: 0 minutes  Total: 75 minutes

## 2023-10-06 NOTE — H&P (VIEW-ONLY)
Patient: Albania Soni  MRN: [id number] Sex: female Age: 62 y.o.  Date of Service: 10/6/2023 YOB: 1960      ASSESSMENT AND PLAN  I discussed the findings with Albania Soni and have recommended the followin. Dysphagia to solids and liquids s/p MDL with CP botox injection total 27.5U and balloon dilation on 23 with Dr. Vernon without benefit. She has had an extensive workup for GERD, which has been normal. Manometry also read as normal however, her swallows appear somewhat fragmented, and her esophageal follow-through on her MBS reveals stasis of contrast in the cervical esophagus, which could explain her symptoms. 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 She has not been evaluated for EoE as a cause of her dysphagia.   - Counseled on esophageal diet  - Recommend esophagoscopy with biopsies to evaluate for EoE, repeat balloon dilation to 30+mm. Risks, benefits, and alternatives of esophagoscopy and dilation discussed with the patient, including but not limited to bleeding, infection, pain, need for repeat procedure, no improvement in symptoms, and rarely, esophageal perforation. The patient expressed understanding and agrees to proceed.   - Will also discuss her case further with my foregut colleagues    2. Dysphonia x 2 years, no masses or lesions on endoscopy. 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  - Consider voice therapy when amenable      CHIEF COMPLAINT  Chief Complaint   Patient presents with    New Patient Visit     Referred by  (throat problems)       HISTORY OF PRESENT ILLNESS  Albania Soni is a 62 y.o. female referred by Reed Mabry MD for evaluation of dysphagia.  The patient reports she constantly feels like she has something stuck in her throat, she can't  swallow well, feels as though foods and liquids go down really slow. She coughs and feels like she is going to throw up. She also reports difficulty burping for about 1 year. She sometimes can make herself burp but cannot burp if she needs to. Also has had voice change for about 2 years, it is raspy and hoarse.    She notes her issues first started around Oct 2021 beginning with a hoarse voice. She was treated with antibiotics for presumed pneumonia but symptoms were persistent. She then had progression of her symptoms to include nausea and dry heaves. Referred to local pulmonologist who obtained CT chest that showed a small pulmonary nodule. She also saw a local GI physician Dr. Driver who diagnosed and treated her for H Pylori. Then saw Dr. Darin Shannon who recommended radiographic surveillance for her nodule. She was then referred to Dr. Bravo for manometry and pH testing, performed 12/21/22 which were both normal without significant acid reflux. Gastric emptying study 1/10/23 also normal. She then saw Dr. Vernon for possible CP issues and underwent MDL with CP botox injection total 27.5U and balloon dilation (diameter unreported) on 6/16/23. She reports this did not help.     The dysphagia history includes:      Dysphagia for solids               yes    Dysphagia for liquids              yes    Dysphagia for pills                  no  Associated weight loss            no    Recent pneumonia/bronchitis  no  GERD/Acid reflux   no    No history of autoimmune or rheumatologic issues.     Prior Dr. Vernon Notes:  This is a 62 year old female with a history of an interesting swallow dysfunction s/p CP Botox and dilation. Of note she had a manometry that shows normal peristalsis. She had pH impedance that showed no significant reflux. She has hiatal hernia with reduced tone of the LES and occasional reflux symptoms. Despite prior manometry, the esophageal dysmotility was notable on esophagram. She has oropharyngeal  "symptoms as well with persistence seen previous scope exam.     We discussed additional options that may be helpful to include repeat manometry, or consideration of endoflip that may be helpful in determining the esophageal motility. We recommended seeing my colleague for further discussion of this.         ADDITIONAL HISTORY  Past Medical History  She has no past medical history on file. Surgical History  She has a past surgical history that includes Other surgical history (10/13/2022) and MR angio head wo IV contrast (7/17/2022).   Social History  She reports that she does not have a smoking history on file. She has never used smokeless tobacco. She reports that she does not currently use alcohol. She reports that she does not use drugs. Allergies  Penicillins     Family History  No family history on file.     REVIEW OF SYSTEMS  All 10 systems were reviewed and negative except for above.      PHYSICAL EXAM  ENT Physical Exam     General: Well-nourished and developed, alert and appropriate, no distress, voice [id number]    Respiratory: Breathing quietly, no stridor    Eyes:  Pupils reactive, sclera clear, external ocular muscles intact, no nystagmus.      Ears:  Pinnae normal. External auditory canals clear and tympanic membranes intact.    Nasal cavity:  No anterior lesions, masses or polyps.    Oral cavity/oropharynx:  Buccal mucosa is moist without lesions or masses, tongue midline and palate elevates symmetrically.    Neck:  Soft. There is no lymphadenopathy or thyromegaly.      Neurologic:  Cranial nerves II-XII grossly intact.       Last Recorded Vitals  Height 1.676 m (5' 6\"), weight 71.5 kg (157 lb 11.2 oz).    RESULTS    Patient Reported Outcome Measures  N/A    Laboratory, Radiology, and Pathology  I personally reviewed the following results, with the following interpretation:   Manometry 12/21/22: Report is normal, but on my review her swallows appear somewhat fragmented.   The LES pressure is low at 9.3 " mmHg and it appears to relax normally with a normal median IRP at 1.5mmHg. There is evidence of a very small hiatal hernia. The study of the esophageal peristalsis shows normal esophageal motility in all of the liquid swallows. The mean DCI is normal at 2962 mmHg-cm-sec. There was complete bolus clearance in 100 % of swallows.    pH testing 12/21/22: DeMeester 4.7  - The total acid exposure time was 0.8 % and the DeMeester score was 4.7 indicating absence of acid reflux  - There was a total of 47 reflux episodes of which 9 were proximal episodes  - There was a total of 21 weakly acid reflux episodes which is consider   normal  - There were a total of 13 symptoms reported (heartburn, chets pain, cough) with a SAP of 80% indicatig poor symptom correlation    Gastric emptying study 1/10/23 - no gastroparesis    Esophagram 8/28/23 - LES appears to relax normally, unable to evaluate motility due to limited images/video    MBS 8/28/23 - mild osteophytes, small CP web; however there is evidence of delayed emptying of the cervical esophagus as there is stasis of contrast post-swallow         PROCEDURES  Procedures     Flexible Fiberoptic Laryngoscopy with Stroboscopy    Patient failed a mirror exam due to limitations of equipment and the need for stroboscopy to assess glottic vibration and closure.     PREOPERATIVE DIAGNOSIS: Dysphonia    POSTOPERATIVE DIAGNOSIS: Same    PROCEDURE:  Strobovideolaryngoscopy    ANESTHESIA:  Topical    COMPLICATIONS:  None    SPECIMENS:  None    PROCEDURE IN DETAIL: The patient was seated in an upright position.  The nasal cavity was topically decongested and anesthetized.  The stroboscopic microphone was held to the neck at the level of the larynx.  The distal chip video laryngoscope was passed through the nasal cavity.  The nasal cavity and nasopharynx were within normal limits.  The base of tongue revealed no lesions or masses. The following findings on stroboscopy were  noted:      Appearance of pharynx/larynx/Other Structural Lesions: no masses or lesions   Vocal Fold Mobility               Right VF: mobile               Left VF: mobile   TVF Appearance               Edema/Erythema: none, small prominent vessels superior surface               Lesions/vibratory margin irregularities: mild atrophy and fullness at the striking zone   Glottic Closure Pattern: slit hourglass    Vibration:               Phase: normal    Periodicity: regular               Amplitude: slightly increased                Waveform: asymmetric L/R      Muscle Tension Patterns:  moderate AP and mild false fold compression   Other abnormal findings: none    The patient tolerated the procedure well.       ----------------------------------------------------------------------  Jillian Hernandez MD, MAEd    [facility]  Department of Otolaryngology - Head and Neck Surgery  [facility]    The total time I spent in care of this patient today (excluding time spent on other billable services) is as follows:    Time Spent  Prep time on day of patient encounter: 15 minutes  Time spent directly with patient, family or caregiver: 30 minutes  Additional Time Spent on Patient Care Activities: 15 minutes  Documentation Time: 15 minutes  Other Time Spent: 0 minutes  Total: 75 minutes

## 2023-10-09 ENCOUNTER — TELEPHONE (OUTPATIENT)
Dept: OTOLARYNGOLOGY | Facility: HOSPITAL | Age: 63
End: 2023-10-09
Payer: COMMERCIAL

## 2023-10-09 NOTE — TELEPHONE ENCOUNTER
"Called pt per Dr. Hernandez and informed her that Dr. Hernandez is \"recommending endoscopy with biopsies and possible repeat dilation\". I also informed pt, per Dr. Hernandez, that she \"would like to start with a look into her esophagus and take biopsies to make sure we are not missing some kind of allergy that might be contributing to her symptoms\". Pt stated \"ok\" and agreed to 10/26 to have this done. Dr. Hrenandez notified.  "

## 2023-10-10 DIAGNOSIS — R13.12 OROPHARYNGEAL DYSPHAGIA: ICD-10-CM

## 2023-10-25 ENCOUNTER — TELEPHONE (OUTPATIENT)
Dept: OTOLARYNGOLOGY | Facility: CLINIC | Age: 63
End: 2023-10-25
Payer: COMMERCIAL

## 2023-10-25 NOTE — TELEPHONE ENCOUNTER
Called pt and informed her to arrive to the [facility] endosuite by 9:00 AM on 10/26 (procedure scheduled for 10:00 AM). Reminded pt NPO at midnight the night before and that she needs to have a . Pt stated thank you.

## 2023-10-26 ENCOUNTER — HOSPITAL ENCOUNTER (OUTPATIENT)
Dept: GASTROENTEROLOGY | Facility: HOSPITAL | Age: 63
Setting detail: OUTPATIENT SURGERY
Discharge: HOME | End: 2023-10-26
Payer: COMMERCIAL

## 2023-10-26 VITALS
DIASTOLIC BLOOD PRESSURE: 69 MMHG | RESPIRATION RATE: 15 BRPM | HEART RATE: 56 BPM | OXYGEN SATURATION: 94 % | HEIGHT: 66 IN | WEIGHT: 155 LBS | BODY MASS INDEX: 24.91 KG/M2 | TEMPERATURE: 97.2 F | SYSTOLIC BLOOD PRESSURE: 137 MMHG

## 2023-10-26 DIAGNOSIS — R13.10 DYSPHAGIA, UNSPECIFIED TYPE: ICD-10-CM

## 2023-10-26 DIAGNOSIS — R13.12 OROPHARYNGEAL DYSPHAGIA: Primary | ICD-10-CM

## 2023-10-26 DIAGNOSIS — Q39.4 CRICOPHARYNGEAL WEB (HHS-HCC): ICD-10-CM

## 2023-10-26 PROCEDURE — 88305 TISSUE EXAM BY PATHOLOGIST: CPT | Performed by: STUDENT IN AN ORGANIZED HEALTH CARE EDUCATION/TRAINING PROGRAM

## 2023-10-26 PROCEDURE — 43239 EGD BIOPSY SINGLE/MULTIPLE: CPT | Performed by: OTOLARYNGOLOGY

## 2023-10-26 PROCEDURE — 2500000004 HC RX 250 GENERAL PHARMACY W/ HCPCS (ALT 636 FOR OP/ED): Performed by: OTOLARYNGOLOGY

## 2023-10-26 PROCEDURE — 7100000010 HC PHASE TWO TIME - EACH INCREMENTAL 1 MINUTE: Performed by: OTOLARYNGOLOGY

## 2023-10-26 PROCEDURE — 3700000012 HC SEDATION LEVEL 5+ TIME - INITIAL 15 MINUTES 5/> YEARS: Performed by: OTOLARYNGOLOGY

## 2023-10-26 PROCEDURE — 99152 MOD SED SAME PHYS/QHP 5/>YRS: CPT | Performed by: OTOLARYNGOLOGY

## 2023-10-26 PROCEDURE — 99153 MOD SED SAME PHYS/QHP EA: CPT | Performed by: OTOLARYNGOLOGY

## 2023-10-26 PROCEDURE — 43233 EGD BALLOON DIL ESOPH30 MM/>: CPT | Performed by: OTOLARYNGOLOGY

## 2023-10-26 PROCEDURE — 3700000013 HC SEDATION LEVEL 5+ TIME - EACH ADDITIONAL 15 MINUTES: Performed by: OTOLARYNGOLOGY

## 2023-10-26 PROCEDURE — 7100000009 HC PHASE TWO TIME - INITIAL BASE CHARGE: Performed by: OTOLARYNGOLOGY

## 2023-10-26 PROCEDURE — 2720000007 HC OR 272 NO HCPCS: Performed by: OTOLARYNGOLOGY

## 2023-10-26 PROCEDURE — 88305 TISSUE EXAM BY PATHOLOGIST: CPT | Mod: TC,SUR | Performed by: OTOLARYNGOLOGY

## 2023-10-26 RX ORDER — FLUMAZENIL 0.1 MG/ML
0.2 INJECTION INTRAVENOUS ONCE AS NEEDED
Status: CANCELLED | OUTPATIENT
Start: 2023-10-26

## 2023-10-26 RX ORDER — FENTANYL CITRATE 50 UG/ML
INJECTION, SOLUTION INTRAMUSCULAR; INTRAVENOUS AS NEEDED
Status: COMPLETED | OUTPATIENT
Start: 2023-10-26 | End: 2023-10-26

## 2023-10-26 RX ORDER — NALOXONE HYDROCHLORIDE 0.4 MG/ML
0.2 INJECTION, SOLUTION INTRAMUSCULAR; INTRAVENOUS; SUBCUTANEOUS EVERY 5 MIN PRN
Status: CANCELLED | OUTPATIENT
Start: 2023-10-26

## 2023-10-26 RX ORDER — ONDANSETRON HYDROCHLORIDE 2 MG/ML
4 INJECTION, SOLUTION INTRAVENOUS ONCE AS NEEDED
Status: CANCELLED | OUTPATIENT
Start: 2023-10-26

## 2023-10-26 RX ORDER — MIDAZOLAM HYDROCHLORIDE 1 MG/ML
INJECTION, SOLUTION INTRAMUSCULAR; INTRAVENOUS AS NEEDED
Status: COMPLETED | OUTPATIENT
Start: 2023-10-26 | End: 2023-10-26

## 2023-10-26 RX ORDER — SODIUM CHLORIDE, SODIUM LACTATE, POTASSIUM CHLORIDE, CALCIUM CHLORIDE 600; 310; 30; 20 MG/100ML; MG/100ML; MG/100ML; MG/100ML
20 INJECTION, SOLUTION INTRAVENOUS CONTINUOUS
Status: DISCONTINUED | OUTPATIENT
Start: 2023-10-26 | End: 2023-10-27 | Stop reason: HOSPADM

## 2023-10-26 RX ADMIN — MIDAZOLAM 1 MG: 1 INJECTION INTRAMUSCULAR; INTRAVENOUS at 10:44

## 2023-10-26 RX ADMIN — FENTANYL CITRATE 50 MCG: 50 INJECTION, SOLUTION INTRAMUSCULAR; INTRAVENOUS at 10:44

## 2023-10-26 RX ADMIN — MIDAZOLAM 1 MG: 1 INJECTION INTRAMUSCULAR; INTRAVENOUS at 11:00

## 2023-10-26 RX ADMIN — FENTANYL CITRATE 25 MCG: 50 INJECTION, SOLUTION INTRAMUSCULAR; INTRAVENOUS at 10:59

## 2023-10-26 RX ADMIN — FENTANYL CITRATE 50 MCG: 50 INJECTION, SOLUTION INTRAMUSCULAR; INTRAVENOUS at 11:05

## 2023-10-26 ASSESSMENT — PAIN - FUNCTIONAL ASSESSMENT
PAIN_FUNCTIONAL_ASSESSMENT: 0-10

## 2023-10-26 ASSESSMENT — COLUMBIA-SUICIDE SEVERITY RATING SCALE - C-SSRS
6. HAVE YOU EVER DONE ANYTHING, STARTED TO DO ANYTHING, OR PREPARED TO DO ANYTHING TO END YOUR LIFE?: NO
2. HAVE YOU ACTUALLY HAD ANY THOUGHTS OF KILLING YOURSELF?: NO
1. IN THE PAST MONTH, HAVE YOU WISHED YOU WERE DEAD OR WISHED YOU COULD GO TO SLEEP AND NOT WAKE UP?: NO

## 2023-10-26 ASSESSMENT — PAIN SCALES - GENERAL
PAINLEVEL_OUTOF10: 0 - NO PAIN

## 2023-10-26 NOTE — DISCHARGE INSTRUCTIONS
Postoperative Instructions  Endoscopic Voice and Airway Surgery  General: Pain of the nose and throat can be normal after these procedures.  Diet: Start with liquids after anesthesia. Soft foods may feel best for the first 2-3 days following surgery. Soft foods include soup, noodles, scrambled eggs, oatmeal, yogurt, smoothies, applesauce, mashed potatoes, pasta or ice cream. Avoid toast, chips, hard crusted breads, and steak or similar meats. After your throat begins to feel less sore, you can continue your normal diet.   Pain Control: You may experience a mild to moderate sore throat or tongue for several days following the procedure. The throat pain may also seem to cause earaches from referred pain. We encourage use of acetaminophen (Tylenol) and /or ibuprofen (Motrin/Advil) for most pain control. These two medications can be taken together or can be alternated. We will sometimes prescribe you something stronger for pain for “break through.”  Use it only as needed. Do not drive while taking any narcotic pain medications.  Follow-up: Your follow-up with your doctor should be scheduled 2-3 weeks following surgery. Biopsy results will usually be available in the system 7 days following the surgery. You will be informed of the results.     Please call the office or go to the emergency room if you experience any of the following: Difficulty breathing, Inability to swallow, Fever great than 101 degrees, Significant bleeding, or any new/concerning symptoms.    Contact:  During office hours between 8 AM and 5 PM, please call Dr. Hernandez's office at [phone].   If you have an emergency over night or on the weekend, please call [phone] and ask the  to connect you to the ENT resident on call.

## 2023-11-07 LAB
LABORATORY COMMENT REPORT: NORMAL
PATH REPORT.FINAL DX SPEC: NORMAL
PATH REPORT.GROSS SPEC: NORMAL
PATH REPORT.TOTAL CANCER: NORMAL

## 2023-11-15 ENCOUNTER — OFFICE VISIT (OUTPATIENT)
Dept: OTOLARYNGOLOGY | Facility: HOSPITAL | Age: 63
End: 2023-11-15
Payer: COMMERCIAL

## 2023-11-15 DIAGNOSIS — R49.0 DYSPHONIA: ICD-10-CM

## 2023-11-15 DIAGNOSIS — R13.10 DYSPHAGIA, UNSPECIFIED TYPE: Primary | ICD-10-CM

## 2023-11-15 PROCEDURE — 99214 OFFICE O/P EST MOD 30 MIN: CPT | Performed by: OTOLARYNGOLOGY

## 2023-11-15 PROCEDURE — 1036F TOBACCO NON-USER: CPT | Performed by: OTOLARYNGOLOGY

## 2023-11-15 NOTE — PROGRESS NOTES
Patient: Albania Soni  MRN: [id number] Sex: female Age: 62 y.o.  Date of Service: 11/15/2023 YOB: 1960      ASSESSMENT AND PLAN  I discussed the findings with Albania Soni and have recommended the followin. Dysphagia to solids and liquids, globus sensation s/p MDL with CP botox injection total 27.5U and balloon dilation on 23 with Dr. Vernon without benefit. She has had an extensive workup for GERD, which has been normal. Manometry normal although one swallow was fragmented, and her esophageal follow-through on her MBS reveals stasis of contrast in the cervical esophagus. 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 She is now s/p esophagoscopy and dilation to 32mm with multiple biopsies 10/26/23 with some swallow improvement but still persistent globus. She is reassured that all her testing as been so far normal and would like to monitor her symptoms.   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 Dysphonia x 2 years, no masses or lesions on endoscopy. 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  - Consider voice therapy when amenable      CHIEF COMPLAINT  Follow-up dysphagia    HISTORY OF PRESENT ILLNESS  Albania Soni is a 62 y.o. female who we have been following for dysphagia, dysphonia, and globus sensation. S/p esophagoscopy and dilation to 32mm with multiple biopsies 10/26/23 with some swallow improvement but still persistent globus.    11/15/23  She reports she has been doing ok since the procedure. Swallowing is better. Still feels like something is stuck in her throat and is not burping well. Voice is still hoarse although does not bother her. All biopsies negative for  abnormalities.    10/6/23  The patient reports she constantly feels like she has something stuck in her throat, she can't swallow well, feels as though foods and liquids go down really slow. She coughs and feels like she is going to throw up. She also reports difficulty burping for about 1 year. She sometimes can make herself burp but cannot burp if she needs to. Also has had voice change for about 2 years, it is raspy and hoarse.    She notes her issues first started around Oct 2021 beginning with a hoarse voice. She was treated with antibiotics for presumed pneumonia but symptoms were persistent. She then had progression of her symptoms to include nausea and dry heaves. Referred to local pulmonologist who obtained CT chest that showed a small pulmonary nodule. She also saw a local GI physician Dr. Driver who diagnosed and treated her for H Pylori. Then saw Dr. Darin Shannon who recommended radiographic surveillance for her nodule. She was then referred to Dr. Bravo for manometry and pH testing, performed 12/21/22 which were both normal without significant acid reflux. Gastric emptying study 1/10/23 also normal. She then saw Dr. Vernon for possible CP issues and underwent MDL with CP botox injection total 27.5U and balloon dilation (diameter unreported) on 6/16/23. She reports this did not help.     The dysphagia history includes:      Dysphagia for solids               yes    Dysphagia for liquids              yes    Dysphagia for pills                  no  Associated weight loss            no    Recent pneumonia/bronchitis  no  GERD/Acid reflux   no    No history of autoimmune or rheumatologic issues.     Prior Dr. Vernon Notes:  This is a 62 year old female with a history of an interesting swallow dysfunction s/p CP Botox and dilation. Of note she had a manometry that shows normal peristalsis. She had pH impedance that showed no significant reflux. She has hiatal hernia with reduced tone of the LES and occasional  reflux symptoms. Despite prior manometry, the esophageal dysmotility was notable on esophagram. She has oropharyngeal symptoms as well with persistence seen previous scope exam.     We discussed additional options that may be helpful to include repeat manometry, or consideration of endoflip that may be helpful in determining the esophageal motility. We recommended seeing my colleague for further discussion of this.       ADDITIONAL HISTORY  Past Medical History  She has a past medical history of Anxiety. Surgical History  She has a past surgical history that includes Other surgical history (10/13/2022) and MR angio head wo IV contrast (7/17/2022).   Social History  She reports that she has never smoked. She has never used smokeless tobacco. She reports that she does not currently use alcohol. She reports that she does not use drugs. Allergies  Penicillins     Family History  No family history on file.     REVIEW OF SYSTEMS  All 10 systems were reviewed and negative except for above.      PHYSICAL EXAM  ENT Physical Exam     General: Well-nourished and developed, alert and appropriate, no distress, voice [id number]    Respiratory: Breathing quietly, no stridor    Eyes:  Pupils reactive, sclera clear, external ocular muscles intact, no nystagmus.      Ears:  Pinnae normal. External auditory canals clear and tympanic membranes intact.    Nasal cavity:  No anterior lesions, masses or polyps.    Oral cavity/oropharynx:  Buccal mucosa is moist without lesions or masses, tongue midline and palate elevates symmetrically.    Neck:  Soft. There is no lymphadenopathy or thyromegaly.      Neurologic:  Cranial nerves II-XII grossly intact.       Last Recorded Vitals  There were no vitals taken for this visit.    RESULTS    Patient Reported Outcome Measures  N/A    Laboratory, Radiology, and Pathology  I personally reviewed the following results, with the following interpretation:   Manometry 12/21/22: Report is normal, but on my  review her swallows appear somewhat fragmented.   The LES pressure is low at 9.3 mmHg and it appears to relax normally with a normal median IRP at 1.5mmHg. There is  evidence of a very small hiatal hernia. The study of the esophageal peristalsis shows normal esophageal motility in all of the liquid swallows. The mean DCI is normal at 2962 mmHg-cm-sec. There was complete bolus clearance in 100 % of swallows.    pH testing 12/21/22: DeMeester 4.7  - The total acid exposure time was 0.8 % and the DeMeester score was 4.7 indicating absence of acid reflux  - There was a total of 47 reflux episodes of which 9 were proximal episodes  - There was a total of 21 weakly acid reflux episodes which is consider   normal  - There were a total of 13 symptoms reported (heartburn, chets pain, cough) with a SAP of 80% indicatig poor symptom correlation    Gastric emptying study 1/10/23 - no gastroparesis    Esophagram 8/28/23 - LES appears to relax normally, unable to evaluate motility due to limited images/video    MBS 8/28/23 - mild osteophytes, small CP web; however there is evidence of delayed emptying of the cervical esophagus as there is stasis of contrast post-swallow         PROCEDURES  Procedures     Flexible Fiberoptic Laryngoscopy with Stroboscopy    Patient failed a mirror exam due to limitations of equipment and the need for stroboscopy to assess glottic vibration and closure.     PREOPERATIVE DIAGNOSIS: Dysphonia    POSTOPERATIVE DIAGNOSIS: Same    PROCEDURE:  Strobovideolaryngoscopy    ANESTHESIA:  Topical    COMPLICATIONS:  None    SPECIMENS:  None    PROCEDURE IN DETAIL: The patient was seated in an upright position.  The nasal cavity was topically decongested and anesthetized.  The stroboscopic microphone was held to the neck at the level of the larynx.  The distal chip video laryngoscope was passed through the nasal cavity.  The nasal cavity and nasopharynx were within normal limits.  The base of tongue revealed no  lesions or masses. The following findings on stroboscopy were noted:      Appearance of pharynx/larynx/Other Structural Lesions: no masses or lesions   Vocal Fold Mobility               Right VF: mobile               Left VF: mobile   TVF Appearance               Edema/Erythema: none, small prominent vessels superior surface               Lesions/vibratory margin irregularities: mild atrophy and fullness at the striking zone   Glottic Closure Pattern: slit hourglass    Vibration:               Phase: normal    Periodicity: regular               Amplitude: slightly increased                Waveform: asymmetric L/R      Muscle Tension Patterns:  moderate AP and mild false fold compression   Other abnormal findings: none    The patient tolerated the procedure well.       ----------------------------------------------------------------------  Jillian Hernandez MD, MAEd    [facility]  Department of Otolaryngology - Head and Neck Surgery  [facility]    The total time I spent in care of this patient today (excluding time spent on other billable services) is as follows:    Time Spent  Prep time on day of patient encounter: 5 minutes  Time spent directly with patient, family or caregiver: 15 minutes  Additional Time Spent on Patient Care Activities: 5 minutes  Documentation Time: 5 minutes  Other Time Spent: 0 minutes  Total: 30 minutes

## 2024-06-24 ENCOUNTER — HOSPITAL ENCOUNTER (OUTPATIENT)
Dept: RADIOLOGY | Facility: CLINIC | Age: 64
Discharge: HOME | End: 2024-06-24
Payer: MEDICARE

## 2024-06-24 DIAGNOSIS — R91.1 SOLITARY PULMONARY NODULE: ICD-10-CM

## 2024-06-24 PROCEDURE — 71250 CT THORAX DX C-: CPT

## 2024-06-24 PROCEDURE — 71250 CT THORAX DX C-: CPT | Performed by: RADIOLOGY

## 2024-07-22 NOTE — PROGRESS NOTES
Subjective   Albania Soni  is a 63 y.o. female who presents for evaluation of Lung nodule.     This patient presented to medical attention with shortness of breath which began in October 2021. At that time she had a sore throat and a hoarse voice. She was treated with antibiotic with a presumptive diagnosis of pneumonia. Her hoarse voice and shortness of breath continued, and she was referred to a pulmonologist who ordered a CT scan of the chest. This was performed on 12/15/2021 and showed a 5 mm left lower lobe pulmonary nodule. I recommended radiographic surveillance.    Currently the patient is presenting for routine follow-up and in their usual state of health. She denies the following symptoms: chest pain, shortness of breath at rest, shortness of breath with activity, cough, hemoptysis, fevers, chills, and weight loss.  She does have some GI symptom and has been seen by ENT and GI    There have been no significant changes to their documented medical, surgical and family history.     She  reports that she has never smoked. She has never used smokeless tobacco. She reports that she does not currently use alcohol. She reports that she does not use drugs.    Objective   Physical Exam  The patient is well-appearing and in no acute distress. The trachea is midline and there is no crepitus. The lungs were clear to auscultation grossly. There was good effort and excursion. The heart had a regular rate and rhythm. The abdomen was soft, nontender and nondistended. The extremities had no edema or gross deformities. Mood and affect are appropriate.  Diagnostic Studies  I reviewed a CT chest performed recently. I do not see any new or concerning nodules or adenopathy  === 06/24/24 ===    CT CHEST WO IV CONTRAST    - Impression -  1. Stable size of a 0.4 cm solid nodule within the left lower lobe  (series 5, image 194) as compared with exams dating back to  08/09/2022, likely benign given stability over time. However,  if  patient has high risk factors for lung cancer, consider annual  low-dose CT chest follow-up for further evaluation.  2. No acute process within the chest.  3. Mild upper lobe predominant paraseptal emphysematous changes.  4. Minimal coronary artery calcifications.    I personally reviewed the images/study and I agree with the resident,  Rodriguez Wright D.O., findings as stated. This study was interpreted  at [place], [place].    MACRO:  None    Signed by: Faisal Roland 6/25/2024 11:09 PM  Dictation workstation:   [id number]    Assessment/Plan   Overall, I believe that the patient is doing well.     Based on the patient's clinical presentation and my review of their radiographic imaging, I believe the lung nodule is unlikely to represent a malignant process.  This has not changed in almost 2 years of observation and can be safely considered benign in this low risk patient. We discussed ongoing obsevration and she was comfortable with follow up as needed    Plan: follow-up as needed.     I discussed this in detail with the patient, including a discussion of alternatives. They were comfortable with this approach.     Frantz Shannon MD  [phone]

## 2024-07-24 ENCOUNTER — APPOINTMENT (OUTPATIENT)
Dept: SURGERY | Facility: CLINIC | Age: 64
End: 2024-07-24
Payer: COMMERCIAL

## 2024-07-31 ENCOUNTER — OFFICE VISIT (OUTPATIENT)
Dept: SURGERY | Facility: CLINIC | Age: 64
End: 2024-07-31
Payer: MEDICARE

## 2024-07-31 VITALS
SYSTOLIC BLOOD PRESSURE: 115 MMHG | DIASTOLIC BLOOD PRESSURE: 61 MMHG | OXYGEN SATURATION: 98 % | HEART RATE: 87 BPM | TEMPERATURE: 97.2 F

## 2024-07-31 DIAGNOSIS — R91.1 LUNG NODULE: Primary | ICD-10-CM

## 2024-07-31 PROCEDURE — 1126F AMNT PAIN NOTED NONE PRSNT: CPT | Performed by: THORACIC SURGERY (CARDIOTHORACIC VASCULAR SURGERY)

## 2024-07-31 PROCEDURE — 99214 OFFICE O/P EST MOD 30 MIN: CPT | Performed by: THORACIC SURGERY (CARDIOTHORACIC VASCULAR SURGERY)

## 2024-07-31 PROCEDURE — 1159F MED LIST DOCD IN RCRD: CPT | Performed by: THORACIC SURGERY (CARDIOTHORACIC VASCULAR SURGERY)

## 2024-07-31 RX ORDER — ESCITALOPRAM OXALATE 10 MG/1
1 TABLET ORAL
COMMUNITY
Start: 2024-06-21

## 2024-07-31 RX ORDER — CALCIUM CARBONATE 400(1000)
2000 TABLET,CHEWABLE ORAL DAILY
COMMUNITY

## 2024-07-31 RX ORDER — ASPIRIN 81 MG/1
81 TABLET ORAL DAILY
COMMUNITY

## 2024-07-31 SDOH — ECONOMIC STABILITY: FOOD INSECURITY: WITHIN THE PAST 12 MONTHS, YOU WORRIED THAT YOUR FOOD WOULD RUN OUT BEFORE YOU GOT MONEY TO BUY MORE.: NEVER TRUE

## 2024-07-31 SDOH — ECONOMIC STABILITY: FOOD INSECURITY: WITHIN THE PAST 12 MONTHS, THE FOOD YOU BOUGHT JUST DIDN'T LAST AND YOU DIDN'T HAVE MONEY TO GET MORE.: NEVER TRUE

## 2024-07-31 ASSESSMENT — PATIENT HEALTH QUESTIONNAIRE - PHQ9
2. FEELING DOWN, DEPRESSED OR HOPELESS: NOT AT ALL
1. LITTLE INTEREST OR PLEASURE IN DOING THINGS: NOT AT ALL
SUM OF ALL RESPONSES TO PHQ9 QUESTIONS 1 AND 2: 0

## 2024-07-31 ASSESSMENT — ENCOUNTER SYMPTOMS
LOSS OF SENSATION IN FEET: 0
OCCASIONAL FEELINGS OF UNSTEADINESS: 0
DEPRESSION: 0

## 2024-07-31 ASSESSMENT — PAIN SCALES - GENERAL: PAINLEVEL: 0-NO PAIN

## 2025-04-26 NOTE — PROGRESS NOTES
Subjective   Patient ID: [place]ia Soni is a 64 y.o. female who presents for EVALUATION OF MICROSCOPIC HEMATURIA.   HPI:  Are you experiencing:  Burning on urination --NO  Pain on urination  --NO  Urinary frequency -- NO  Urinary urgency -- NO  Urge incontinence --NO  Urinary stress incontinence  -- NO  Number of pads used per day --NONE  Enuresis -- NO  Nocturia--1-2 X ON THE AVG  Hematuria --NO  Hesitancy -- NO  Post void fullness -- NO  Strength of your stream--COMFORTABLE     ROS:  General-- No C/O fever or chills  Head-- No C/O Dizziness  Eyes-- NO  C/O blurry or double vision  Ears-- No C/O hearing loss  Neck-- Supple  Chest-- No C/O pain or discomfort  Lungs-- No C/O shortness of breath  Abdomen-- No C/O  pain or discomfort, No nausea or vomiting  Back-- No C/O back pain or discomfort  Extremities-- No C/O swelling or pain    OBJECTIVE  General-- well-developed, well-nourished in NAD  Head-- normal cephalic, atraumatic  Eyes-- PERRL, EOM'S FROM, no jaundice  Neck-- Supple, without masses  Chest-- Normal bony structure  Abdomen-- soft, non tender, liver spleen not palpable. No suprapubic masses.  Back-- no flank masses palpable, no CVA tenderness on palpation or percussion  Lymph nodes-- No inguinal lymphadenopathy noted  Extremities -- Normal muscle mass and tone for the patients age  Neurological-- oriented times three    4-28-25  URINALYSIS DIPSTICK-- TRACE OF HEMOGLOBINURIA     2-20-25  L-S SPINE FILMS:  1. Chronic vertebral body changes may reflect post-traumatic or post-inflammatory (discitis) sequela   2. Normal alignment, no instability     4-10-25  CAT SCAN OF THE ABD AND PELVIS:  BLADDER WALL THICKENING    ASSESSMENT / PLAN  A:  MICROSCOPIC HEMATURIA VS HEMOGLOBINURIA  PATHOPHYSIOLOGY OF THE ABOVE AND OPTIONS OF FURTHER EVALUATION DISCUSSED IN DETAIL WITH THE PT   ALL QUESTIONS ANSWERED      H/O COMPRESSION FX'S OF THE SPINE ?ETIOLOGY    P:  URINE FOR A MICRO  PT TO DROP  OFF A URINE FOR A MICRO IN 1-2  WEEKS  'F/U IN 3 WEEKS  FURTHER REC TO FOLLOW   Bobby Weaver MD 04/26/25 2:23 PM   ADDENDUM:  URINE MICRO-- 0-2 RBC'S, 0- WBC'S, 0- BACTERIA

## 2025-04-28 ENCOUNTER — APPOINTMENT (OUTPATIENT)
Age: 65
End: 2025-04-28
Payer: MEDICARE

## 2025-04-28 VITALS
TEMPERATURE: 98.1 F | BODY MASS INDEX: 25.18 KG/M2 | SYSTOLIC BLOOD PRESSURE: 146 MMHG | WEIGHT: 156 LBS | DIASTOLIC BLOOD PRESSURE: 97 MMHG | HEART RATE: 94 BPM

## 2025-04-28 DIAGNOSIS — R31.9 HEMATURIA, UNSPECIFIED TYPE: ICD-10-CM

## 2025-04-28 DIAGNOSIS — R35.1 NOCTURIA: Primary | ICD-10-CM

## 2025-04-28 LAB
POC APPEARANCE, URINE: CLEAR
POC BILIRUBIN, URINE: NEGATIVE
POC BLOOD, URINE: ABNORMAL
POC COLOR, URINE: YELLOW
POC GLUCOSE, URINE: NEGATIVE MG/DL
POC KETONES, URINE: NEGATIVE MG/DL
POC LEUKOCYTES, URINE: NEGATIVE
POC NITRITE,URINE: NEGATIVE
POC PH, URINE: 5.5 PH
POC PROTEIN, URINE: NEGATIVE MG/DL
POC SPECIFIC GRAVITY, URINE: 1.02
POC UROBILINOGEN, URINE: 0.2 EU/DL

## 2025-04-28 PROCEDURE — 81003 URINALYSIS AUTO W/O SCOPE: CPT | Performed by: UROLOGY

## 2025-04-28 PROCEDURE — 99204 OFFICE O/P NEW MOD 45 MIN: CPT | Performed by: UROLOGY

## 2025-04-28 PROCEDURE — 1160F RVW MEDS BY RX/DR IN RCRD: CPT | Performed by: UROLOGY

## 2025-04-28 PROCEDURE — 1159F MED LIST DOCD IN RCRD: CPT | Performed by: UROLOGY

## 2025-04-28 PROCEDURE — 1036F TOBACCO NON-USER: CPT | Performed by: UROLOGY

## 2025-04-28 NOTE — LETTER
April 30, 2025     Frantz Goodson MD  [address]  [address]  [facility] [address]    Patient: Albania Soni   YOB: 1960   Date of Visit: 4/28/2025       Dear Dr. Frantz Goodson MD:    Thank you for referring Albania Soni to me for evaluation. Below are my notes for this consultation.  If you have questions, please do not hesitate to call me. I look forward to following your patient along with you.       Sincerely,     Bobby Weaver MD      CC: No Recipients  ______________________________________________________________________________________    Subjective  Patient ID: Albania Soni is a 64 y.o. female who presents for EVALUATION OF MICROSCOPIC HEMATURIA.   HPI:  Are you experiencing:  Burning on urination --NO  Pain on urination  --NO  Urinary frequency -- NO  Urinary urgency -- NO  Urge incontinence --NO  Urinary stress incontinence  -- NO  Number of pads used per day --NONE  Enuresis -- NO  Nocturia--1-2 X ON THE AVG  Hematuria --NO  Hesitancy -- NO  Post void fullness -- NO  Strength of your stream--COMFORTABLE     ROS:  General-- No C/O fever or chills  Head-- No C/O Dizziness  Eyes-- NO  C/O blurry or double vision  Ears-- No C/O hearing loss  Neck-- Supple  Chest-- No C/O pain or discomfort  Lungs-- No C/O shortness of breath  Abdomen-- No C/O  pain or discomfort, No nausea or vomiting  Back-- No C/O back pain or discomfort  Extremities-- No C/O swelling or pain    OBJECTIVE  General-- well-developed, well-nourished in NAD  Head-- normal cephalic, atraumatic  Eyes-- PERRL, EOM'S FROM, no jaundice  Neck-- Supple, without masses  Chest-- Normal bony structure  Abdomen-- soft, non tender, liver spleen not palpable. No suprapubic masses.  Back-- no flank masses palpable, no CVA tenderness on palpation or percussion  Lymph nodes-- No inguinal lymphadenopathy noted  Extremities -- Normal muscle mass and tone for the patients age  Neurological-- oriented times three    4-28-25  URINALYSIS  DIPSTICK-- TRACE OF HEMOGLOBINURIA     2-20-25  L-S SPINE FILMS:  1. Chronic vertebral body changes may reflect post-traumatic or post-inflammatory (discitis) sequela   2. Normal alignment, no instability     4-10-25  CAT SCAN OF THE ABD AND PELVIS:  BLADDER WALL THICKENING    ASSESSMENT / PLAN  A:  MICROSCOPIC HEMATURIA VS HEMOGLOBINURIA  PATHOPHYSIOLOGY OF THE ABOVE AND OPTIONS OF FURTHER EVALUATION DISCUSSED IN DETAIL WITH THE PT   ALL QUESTIONS ANSWERED      H/O COMPRESSION FX'S OF THE SPINE ?ETIOLOGY    P:  URINE FOR A MICRO  PT TO DROP  OFF A URINE FOR A MICRO IN 1-2 WEEKS  'F/U IN 3 WEEKS  FURTHER REC TO FOLLOW   Bobby Weaver MD 04/26/25 2:23 PM   ADDENDUM:  URINE MICRO-- 0-2 RBC'S, 0- WBC'S, 0- BACTERIA

## 2025-04-29 LAB
BACTERIA #/AREA URNS HPF: NORMAL /HPF
HYALINE CASTS #/AREA URNS LPF: NORMAL /LPF
RBC #/AREA URNS HPF: NORMAL /HPF
SERVICE CMNT-IMP: NORMAL
SQUAMOUS #/AREA URNS HPF: NORMAL /HPF
WBC #/AREA URNS HPF: NORMAL /HPF

## 2025-04-30 LAB — BACTERIA UR CULT: NORMAL

## 2025-05-05 DIAGNOSIS — R31.9 HEMATURIA, UNSPECIFIED TYPE: ICD-10-CM

## 2025-05-07 LAB
BACTERIA #/AREA URNS HPF: NORMAL /HPF
BACTERIA UR CULT: NORMAL
HYALINE CASTS #/AREA URNS LPF: NORMAL /LPF
RBC #/AREA URNS HPF: NORMAL /HPF
SERVICE CMNT-IMP: NORMAL
SQUAMOUS #/AREA URNS HPF: NORMAL /HPF
WBC #/AREA URNS HPF: NORMAL /HPF

## 2025-05-11 NOTE — PROGRESS NOTES
Subjective   Patient ID: Albania Soni is a 64 y.o. female who presents for A F/U ON HER H/O MICROSCOPIC HEMATURIA.     HPI:  Are you experiencing:  Burning on urination --NO  Pain on urination  --NO  Urinary frequency -- NO  Urinary urgency -- NO  Urge incontinence -- NO  Urinary stress incontinence  -- NO  Number of pads used per day --NONE  Enuresis --  NO  Nocturia-- 1 X ON THE AVG  Hematuria -- NO  Post void fullness -- NO    URINALYSIS   5-12-25  DIPSTICK--  1+ PROTEIN, MODERATE HEMOGLOBINURIA -- MICRO-- 3-10 RBC'S, 6-10 EPI'S,0 BACTERIA   5-7-25  MICRO-- 0 - WBC'S, 0 -RBC'S, 0 -BACT, 0-5 -EPI'S  4-28-25  0- WBC'S, 0-2 RBC'S, 0- BACTERIA    4-10-25  CAT SCAN OF THE ABD AND PELVIS:  BLADDER WALL THICKENING     ASSESSMENT / PLAN  A:  MICROSCOPIC HEMATURIA  ON TODAY'S UA  PATHOPHYSIOLOGY OF THE ABOVE AND OPTIONS OF FURTHER EVALUATION DISCUSSED IN DETAIL WITH THE PT   ALL QUESTIONS ANSWERED       H/O COMPRESSION FX'S OF THE SPINE ? ETIOLOGY    H/O OSTEOPENIA THOUGHT TO BE CAUSING THE ABOVE   REMOTE H/O DOUBLE PNEUMONIA      HAD LUNG CANCER  P:  WILL SCHEDULE AN OUT PT CYSTO IN THE NEAR FUTURE TO MAKE SURE THE BLADDER LINING IS OK   IS GOING IN FOR SURGERY ON HIS LUNG  IN THE NEAR FUTURE  Bobby Weaver MD 05/11/25 8:42 AM

## 2025-05-12 ENCOUNTER — APPOINTMENT (OUTPATIENT)
Age: 65
End: 2025-05-12
Payer: MEDICARE

## 2025-05-12 VITALS — DIASTOLIC BLOOD PRESSURE: 77 MMHG | SYSTOLIC BLOOD PRESSURE: 113 MMHG | HEART RATE: 109 BPM

## 2025-05-12 DIAGNOSIS — R31.9 HEMATURIA, UNSPECIFIED TYPE: ICD-10-CM

## 2025-05-12 LAB
POC APPEARANCE, URINE: CLEAR
POC BILIRUBIN, URINE: NEGATIVE
POC BLOOD, URINE: ABNORMAL
POC COLOR, URINE: YELLOW
POC GLUCOSE, URINE: NEGATIVE MG/DL
POC KETONES, URINE: NEGATIVE MG/DL
POC LEUKOCYTES, URINE: NEGATIVE
POC NITRITE,URINE: NEGATIVE
POC PH, URINE: 5.5 PH
POC PROTEIN, URINE: ABNORMAL MG/DL
POC SPECIFIC GRAVITY, URINE: 1.02
POC UROBILINOGEN, URINE: 0.2 EU/DL

## 2025-05-12 PROCEDURE — 99213 OFFICE O/P EST LOW 20 MIN: CPT | Performed by: UROLOGY

## 2025-05-12 PROCEDURE — 1159F MED LIST DOCD IN RCRD: CPT | Performed by: UROLOGY

## 2025-05-12 PROCEDURE — 1160F RVW MEDS BY RX/DR IN RCRD: CPT | Performed by: UROLOGY

## 2025-05-12 PROCEDURE — 81003 URINALYSIS AUTO W/O SCOPE: CPT | Performed by: UROLOGY

## 2025-05-12 PROCEDURE — 1036F TOBACCO NON-USER: CPT | Performed by: UROLOGY

## 2025-05-12 NOTE — LETTER
May 13, 2025     Frantz Goodson MD  [address]  [address]  [facility] [address]    Patient: Albania Soni   YOB: 1960   Date of Visit: 5/12/2025       Dear Dr. Frantz Goodson MD:    Thank you for referring Albania Soni to me for evaluation. Below are my notes for this consultation.  If you have questions, please do not hesitate to call me. I look forward to following your patient along with you.       Sincerely,     Bobby Weaver MD      CC: No Recipients  ______________________________________________________________________________________    Subjective  Patient ID: Albania Soni is a 64 y.o. female who presents for A F/U ON HER H/O MICROSCOPIC HEMATURIA.     HPI:  Are you experiencing:  Burning on urination --NO  Pain on urination  --NO  Urinary frequency -- NO  Urinary urgency -- NO  Urge incontinence -- NO  Urinary stress incontinence  -- NO  Number of pads used per day --NONE  Enuresis --  NO  Nocturia-- 1 X ON THE AVG  Hematuria -- NO  Post void fullness -- NO    URINALYSIS   5-12-25  DIPSTICK--  1+ PROTEIN, MODERATE HEMOGLOBINURIA -- MICRO-- 3-10 RBC'S, 6-10 EPI'S,0 BACTERIA   5-7-25  MICRO-- 0 - WBC'S, 0 -RBC'S, 0 -BACT, 0-5 -EPI'S  4-28-25  0- WBC'S, 0-2 RBC'S, 0- BACTERIA    4-10-25  CAT SCAN OF THE ABD AND PELVIS:  BLADDER WALL THICKENING     ASSESSMENT / PLAN  A:  MICROSCOPIC HEMATURIA  ON TODAY'S UA  PATHOPHYSIOLOGY OF THE ABOVE AND OPTIONS OF FURTHER EVALUATION DISCUSSED IN DETAIL WITH THE PT   ALL QUESTIONS ANSWERED       H/O COMPRESSION FX'S OF THE SPINE ? ETIOLOGY    H/O OSTEOPENIA THOUGHT TO BE CAUSING THE ABOVE   REMOTE H/O DOUBLE PNEUMONIA      HAD LUNG CANCER  P:  WILL SCHEDULE AN OUT PT CYSTO IN THE NEAR FUTURE TO MAKE SURE THE BLADDER LINING IS OK   IS GOING IN FOR SURGERY ON HIS LUNG  IN THE NEAR FUTURE  Bobby Weaver MD 05/11/25 8:42 AM

## 2025-05-13 LAB
BACTERIA #/AREA URNS HPF: ABNORMAL /HPF
HYALINE CASTS #/AREA URNS LPF: ABNORMAL /LPF
RBC #/AREA URNS HPF: ABNORMAL /HPF
SERVICE CMNT-IMP: ABNORMAL
SQUAMOUS #/AREA URNS HPF: ABNORMAL /HPF
WBC #/AREA URNS HPF: ABNORMAL /HPF

## 2025-05-26 NOTE — PROGRESS NOTES
Patient ID: Albania Soni is a 64 y.o. female. PRESENTS FOR A CYSTO TO COMPLETE HER EVALUATION OF MICROSCOPIC HEMATURIA.     4-10-25  CAT SCAN OF THE ABD AND PELVIS:  BLADDER WALL THICKENING OTHERWISE WNL    BRIEF OP NOTE:  PROCEDURE:  CYSTO  ANES :  1 % LIDOCAINE  SURGEON:  CLIFFORD  PRE OP DX:  MICROSCOPIC HEMATURIA   POST OP DIAGNOSIS: NORMAL CYSTOSCOPY    Procedure:  After informed consent was obtained, the patient was taken to the procedure room for cystoscopy due to  ...    Cystoscopy     Procedure Note:    A sterile prep and drape was performed in standard fashion. Lidocaine was used for topical anesthesia. A flexible cystoscope was inserted into the urethra without difficulty revealing normal urethra.     Then entered the bladder revealing bladder mucosa with no erythematous patches or plaques, foreign bodies, stones or papillary lesions. The ureteral orifices were visualized bilaterally. These were orthotopic in location and effluxing clear urine. No masses were seen on retroflexion.     The scope was then removed, again inspecting the urethra on the way out.     Pelvic exam:  NO CYSTOCELE OR RECTOCELE    Post-Procedure:   The cystoscope was removed. The vital signs were stable . The patient tolerated the procedure well. There were no complications.     URINALYSIS DIPSTICK-- TRACE OF HEMOGLOBINURIA   ASSESSMENT / PLAN  A:  PT IS THOUGHT TO HAVE IDIOPATHIC BENIGN MICROSCOPIC HEMATURIA   CAT SCAN DONE 4-10-25 -- WNL  CYSTO DONE 5- 28-25 -- WNL  PATHOPHYSIOLOGY OF THE ABOVE DISCUSSED IN DETAIL   ALL QUESTIONS ANSWERED      H/O COMPRESSION FX'S OF THE SPINE -- ? ETIOLOGY   P:  REASSURANCE, EDUCATION , SUPPORTIVE CARE RENDERED TO THE PT   F/U IN 6 MONTHS TO RE CK A U/A  MACROBID 100 MG BID FOR 2 DAYS   JASPAL HORTON MD  5-28-25  10:25

## 2025-05-28 ENCOUNTER — APPOINTMENT (OUTPATIENT)
Age: 65
End: 2025-05-28
Payer: MEDICARE

## 2025-05-28 VITALS — SYSTOLIC BLOOD PRESSURE: 125 MMHG | HEART RATE: 81 BPM | DIASTOLIC BLOOD PRESSURE: 58 MMHG

## 2025-05-28 DIAGNOSIS — R31.9 HEMATURIA, UNSPECIFIED TYPE: ICD-10-CM

## 2025-05-28 DIAGNOSIS — Z79.2 PROPHYLACTIC ANTIBIOTIC: ICD-10-CM

## 2025-05-28 LAB
POC APPEARANCE, URINE: CLEAR
POC BILIRUBIN, URINE: NEGATIVE
POC BLOOD, URINE: ABNORMAL
POC COLOR, URINE: YELLOW
POC GLUCOSE, URINE: NEGATIVE MG/DL
POC KETONES, URINE: NEGATIVE MG/DL
POC LEUKOCYTES, URINE: NEGATIVE
POC NITRITE,URINE: NEGATIVE
POC PH, URINE: 6 PH
POC PROTEIN, URINE: NEGATIVE MG/DL
POC SPECIFIC GRAVITY, URINE: 1.01
POC UROBILINOGEN, URINE: 0.2 EU/DL

## 2025-05-28 PROCEDURE — 99212 OFFICE O/P EST SF 10 MIN: CPT | Performed by: UROLOGY

## 2025-05-28 PROCEDURE — 81003 URINALYSIS AUTO W/O SCOPE: CPT | Performed by: UROLOGY

## 2025-05-28 PROCEDURE — 52000 CYSTOURETHROSCOPY: CPT | Performed by: UROLOGY

## 2025-05-28 RX ORDER — NITROFURANTOIN 25; 75 MG/1; MG/1
100 CAPSULE ORAL 2 TIMES DAILY
Qty: 4 CAPSULE | Refills: 0 | Status: SHIPPED | OUTPATIENT
Start: 2025-05-28 | End: 2025-05-30

## 2025-05-28 NOTE — LETTER
May 30, 2025     Frantz Goodson MD  [address]  [address]  [facility] [address]    Patient: Albania Soni   YOB: 1960   Date of Visit: 5/28/2025       Dear Dr. Frantz Goodson MD:    Thank you for referring Albania Soni to me for evaluation. Below are my notes for this consultation.  If you have questions, please do not hesitate to call me. I look forward to following your patient along with you.       Sincerely,     Bobby Weaver MD      CC: No Recipients  ______________________________________________________________________________________    Patient ID: Albania Soni is a 64 y.o. female. PRESENTS FOR A CYSTO TO COMPLETE HER EVALUATION OF MICROSCOPIC HEMATURIA.     4-10-25  CAT SCAN OF THE ABD AND PELVIS:  BLADDER WALL THICKENING OTHERWISE WNL    BRIEF OP NOTE:  PROCEDURE:  CYSTO  ANES :  1 % LIDOCAINE  SURGEON:  CLIFFORD  PRE OP DX:  MICROSCOPIC HEMATURIA   POST OP DIAGNOSIS: NORMAL CYSTOSCOPY    Procedure:  After informed consent was obtained, the patient was taken to the procedure room for cystoscopy due to  ...    Cystoscopy     Procedure Note:    A sterile prep and drape was performed in standard fashion. Lidocaine was used for topical anesthesia. A flexible cystoscope was inserted into the urethra without difficulty revealing normal urethra.     Then entered the bladder revealing bladder mucosa with no erythematous patches or plaques, foreign bodies, stones or papillary lesions. The ureteral orifices were visualized bilaterally. These were orthotopic in location and effluxing clear urine. No masses were seen on retroflexion.     The scope was then removed, again inspecting the urethra on the way out.     Pelvic exam:  NO CYSTOCELE OR RECTOCELE    Post-Procedure:   The cystoscope was removed. The vital signs were stable . The patient tolerated the procedure well. There were no complications.     URINALYSIS DIPSTICK-- TRACE OF HEMOGLOBINURIA   ASSESSMENT / PLAN  A:  PT IS THOUGHT TO  HAVE IDIOPATHIC BENIGN MICROSCOPIC HEMATURIA   CAT SCAN DONE 4-10-25 -- WNL  CYSTO DONE 5- 28-25 -- WNL  PATHOPHYSIOLOGY OF THE ABOVE DISCUSSED IN DETAIL   ALL QUESTIONS ANSWERED      H/O COMPRESSION FX'S OF THE SPINE -- ? ETIOLOGY   P:  REASSURANCE, EDUCATION , SUPPORTIVE CARE RENDERED TO THE PT   F/U IN 6 MONTHS TO RE CK A U/A  MACROBID 100 MG BID FOR 2 DAYS   JASPAL HORTON MD  5-28-25  10:25

## 2025-11-17 ENCOUNTER — APPOINTMENT (OUTPATIENT)
Age: 65
End: 2025-11-17
Payer: MEDICARE